# Patient Record
Sex: MALE | URBAN - METROPOLITAN AREA
[De-identification: names, ages, dates, MRNs, and addresses within clinical notes are randomized per-mention and may not be internally consistent; named-entity substitution may affect disease eponyms.]

---

## 2017-01-01 ENCOUNTER — CARE COORDINATION (OUTPATIENT)
Dept: RADIATION ONCOLOGY | Facility: CLINIC | Age: 77
End: 2017-01-01

## 2017-01-01 ENCOUNTER — CARE COORDINATION (OUTPATIENT)
Dept: ONCOLOGY | Facility: CLINIC | Age: 77
End: 2017-01-01

## 2017-01-01 ENCOUNTER — APPOINTMENT (OUTPATIENT)
Dept: RADIATION ONCOLOGY | Facility: CLINIC | Age: 77
End: 2017-01-01
Payer: COMMERCIAL

## 2017-01-01 ENCOUNTER — APPOINTMENT (OUTPATIENT)
Dept: RADIATION ONCOLOGY | Facility: CLINIC | Age: 77
End: 2017-01-01

## 2017-01-01 ENCOUNTER — OFFICE VISIT (OUTPATIENT)
Dept: RADIATION ONCOLOGY | Facility: CLINIC | Age: 77
End: 2017-01-01
Payer: COMMERCIAL

## 2017-01-01 ENCOUNTER — TRANSFERRED RECORDS (OUTPATIENT)
Dept: HEALTH INFORMATION MANAGEMENT | Facility: CLINIC | Age: 77
End: 2017-01-01

## 2017-01-01 ENCOUNTER — SURGERY - HEALTHEAST (OUTPATIENT)
Dept: SURGERY | Facility: CLINIC | Age: 77
End: 2017-01-01

## 2017-01-01 ENCOUNTER — ANESTHESIA - HEALTHEAST (OUTPATIENT)
Dept: SURGERY | Facility: CLINIC | Age: 77
End: 2017-01-01

## 2017-01-01 ENCOUNTER — DOCUMENTATION ONLY (OUTPATIENT)
Dept: RADIATION ONCOLOGY | Facility: CLINIC | Age: 77
End: 2017-01-01

## 2017-01-01 ENCOUNTER — ONCOLOGY VISIT (OUTPATIENT)
Dept: RADIATION ONCOLOGY | Facility: CLINIC | Age: 77
End: 2017-01-01

## 2017-01-01 ENCOUNTER — ONCOLOGY VISIT (OUTPATIENT)
Dept: ONCOLOGY | Facility: CLINIC | Age: 77
End: 2017-01-01
Payer: COMMERCIAL

## 2017-01-01 ENCOUNTER — TRANSFERRED RECORDS (OUTPATIENT)
Dept: ONCOLOGY | Facility: CLINIC | Age: 77
End: 2017-01-01

## 2017-01-01 ENCOUNTER — ONCOLOGY VISIT (OUTPATIENT)
Dept: RADIATION THERAPY | Facility: OUTPATIENT CENTER | Age: 77
End: 2017-01-01

## 2017-01-01 VITALS
SYSTOLIC BLOOD PRESSURE: 96 MMHG | OXYGEN SATURATION: 96 % | HEIGHT: 72 IN | WEIGHT: 156 LBS | HEART RATE: 70 BPM | BODY MASS INDEX: 21.13 KG/M2 | TEMPERATURE: 97.7 F | RESPIRATION RATE: 18 BRPM | DIASTOLIC BLOOD PRESSURE: 61 MMHG

## 2017-01-01 VITALS — WEIGHT: 159 LBS | BODY MASS INDEX: 21.56 KG/M2

## 2017-01-01 VITALS — BODY MASS INDEX: 22.24 KG/M2 | WEIGHT: 164 LBS

## 2017-01-01 VITALS
OXYGEN SATURATION: 96 % | HEART RATE: 70 BPM | TEMPERATURE: 97.7 F | DIASTOLIC BLOOD PRESSURE: 61 MMHG | WEIGHT: 156.5 LBS | BODY MASS INDEX: 21.2 KG/M2 | HEIGHT: 72 IN | SYSTOLIC BLOOD PRESSURE: 96 MMHG | RESPIRATION RATE: 18 BRPM

## 2017-01-01 VITALS — BODY MASS INDEX: 21.7 KG/M2 | WEIGHT: 160 LBS

## 2017-01-01 VITALS — BODY MASS INDEX: 21.97 KG/M2 | WEIGHT: 162 LBS

## 2017-01-01 VITALS — WEIGHT: 164 LBS | BODY MASS INDEX: 22.24 KG/M2

## 2017-01-01 VITALS
RESPIRATION RATE: 18 BRPM | SYSTOLIC BLOOD PRESSURE: 106 MMHG | HEIGHT: 72 IN | HEART RATE: 79 BPM | WEIGHT: 155 LBS | BODY MASS INDEX: 20.99 KG/M2 | DIASTOLIC BLOOD PRESSURE: 68 MMHG | OXYGEN SATURATION: 98 % | TEMPERATURE: 97 F

## 2017-01-01 VITALS — WEIGHT: 163 LBS | BODY MASS INDEX: 22.1 KG/M2

## 2017-01-01 VITALS — BODY MASS INDEX: 21.56 KG/M2 | WEIGHT: 159 LBS

## 2017-01-01 DIAGNOSIS — K11.7 DISTURBANCE OF SALIVARY SECRETION: Primary | ICD-10-CM

## 2017-01-01 DIAGNOSIS — C14.8: Primary | ICD-10-CM

## 2017-01-01 DIAGNOSIS — Z71.81 SPIRITUAL OR RELIGIOUS COUNSELING: Primary | ICD-10-CM

## 2017-01-01 DIAGNOSIS — C06.0 SQUAMOUS CELL CANCER OF BUCCAL MUCOSA (H): Primary | ICD-10-CM

## 2017-01-01 DIAGNOSIS — L02.11 ABSCESS OF NECK: Primary | ICD-10-CM

## 2017-01-01 LAB
BACTERIA SPEC CULT: NO GROWTH
COPATH REPORT: NORMAL
MICRO REPORT STATUS: NORMAL
SPECIMEN SOURCE: NORMAL

## 2017-01-01 PROCEDURE — 77386 ZZC IMRT TREATMENT DELIVERY, COMPLEX: CPT | Performed by: RADIOLOGY

## 2017-01-01 PROCEDURE — 99207 ZZC NO BILLABLE SERVICE THIS VISIT: CPT | Performed by: RADIOLOGY

## 2017-01-01 PROCEDURE — 77014 ZZHC CT GUIDE FOR PLACEMENT RADIATION THERAPY FIELDS: CPT | Performed by: RADIOLOGY

## 2017-01-01 PROCEDURE — 77336 RADIATION PHYSICS CONSULT: CPT | Performed by: RADIOLOGY

## 2017-01-01 PROCEDURE — 77427 RADIATION TX MANAGEMENT X5: CPT | Performed by: RADIOLOGY

## 2017-01-01 PROCEDURE — 77338 DESIGN MLC DEVICE FOR IMRT: CPT | Performed by: RADIOLOGY

## 2017-01-01 PROCEDURE — 87070 CULTURE OTHR SPECIMN AEROBIC: CPT | Performed by: RADIOLOGY

## 2017-01-01 PROCEDURE — 99205 OFFICE O/P NEW HI 60 MIN: CPT | Mod: 25 | Performed by: RADIOLOGY

## 2017-01-01 PROCEDURE — 77300 RADIATION THERAPY DOSE PLAN: CPT | Performed by: RADIOLOGY

## 2017-01-01 PROCEDURE — 77334 RADIATION TREATMENT AID(S): CPT | Performed by: RADIOLOGY

## 2017-01-01 PROCEDURE — 99205 OFFICE O/P NEW HI 60 MIN: CPT | Performed by: INTERNAL MEDICINE

## 2017-01-01 PROCEDURE — 00000346 ZZHCL STATISTIC REVIEW OUTSIDE SLIDES TC 88321: Performed by: INTERNAL MEDICINE

## 2017-01-01 PROCEDURE — 99024 POSTOP FOLLOW-UP VISIT: CPT | Performed by: RADIOLOGY

## 2017-01-01 PROCEDURE — 77301 RADIOTHERAPY DOSE PLAN IMRT: CPT | Performed by: RADIOLOGY

## 2017-01-01 PROCEDURE — 77263 THER RADIOLOGY TX PLNG CPLX: CPT | Performed by: RADIOLOGY

## 2017-01-01 RX ORDER — ASPIRIN 81 MG/1
81 TABLET, CHEWABLE ORAL
COMMUNITY
Start: 2017-01-01 | End: 2017-01-01

## 2017-01-01 RX ORDER — SULFAMETHOXAZOLE/TRIMETHOPRIM 800-160 MG
TABLET ORAL
Refills: 0 | COMMUNITY
Start: 2017-01-01 | End: 2017-01-01

## 2017-01-01 RX ORDER — LORAZEPAM 1 MG/1
0.5-1 TABLET ORAL EVERY 8 HOURS PRN
Qty: 4 TABLET | Refills: 0 | Status: SHIPPED | OUTPATIENT
Start: 2017-01-01 | End: 2017-01-01

## 2017-01-01 RX ORDER — DIPHENOXYLATE HYDROCHLORIDE AND ATROPINE SULFATE 2.5; .025 MG/1; MG/1
1 TABLET ORAL
COMMUNITY
Start: 2010-05-26

## 2017-01-01 RX ORDER — AMOXICILLIN AND CLAVULANATE POTASSIUM 600; 42.9 MG/5ML; MG/5ML
POWDER, FOR SUSPENSION ORAL
Refills: 0 | COMMUNITY
Start: 2017-01-01 | End: 2017-01-01

## 2017-01-01 RX ORDER — FAMOTIDINE 20 MG/1
20 TABLET, FILM COATED ORAL
COMMUNITY
Start: 2017-01-01 | End: 2017-01-01

## 2017-01-01 RX ORDER — CEVIMELINE HYDROCHLORIDE 30 MG/1
30 CAPSULE ORAL 3 TIMES DAILY
Qty: 90 CAPSULE | Refills: 0 | Status: SHIPPED | OUTPATIENT
Start: 2017-01-01 | End: 2017-01-01

## 2017-01-01 RX ORDER — PILOCARPINE HYDROCHLORIDE 5 MG/1
5 TABLET, FILM COATED ORAL 3 TIMES DAILY
Qty: 90 TABLET | Refills: 3 | Status: SHIPPED | OUTPATIENT
Start: 2017-01-01

## 2017-01-01 ASSESSMENT — PAIN SCALES - GENERAL
PAINLEVEL: MILD PAIN (3)
PAINLEVEL: NO PAIN (0)
PAINLEVEL: MODERATE PAIN (4)
PAINLEVEL: NO PAIN (0)
PAINLEVEL: NO PAIN (0)
PAINLEVEL: MILD PAIN (2)
PAINLEVEL: NO PAIN (0)

## 2017-01-01 ASSESSMENT — ENCOUNTER SYMPTOMS
DIZZINESS: 0
DIAPHORESIS: 0
CONSTIPATION: 1
LOSS OF CONSCIOUSNESS: 0
CHILLS: 0
HEMOPTYSIS: 0
HEARTBURN: 0
NAUSEA: 0
DIARRHEA: 0
COUGH: 1
WEAKNESS: 0
FOCAL WEAKNESS: 0
BLOOD IN STOOL: 0
TREMORS: 0
SPUTUM PRODUCTION: 0
WHEEZING: 0
SHORTNESS OF BREATH: 0
FEVER: 0
SENSORY CHANGE: 0
VOMITING: 0
SEIZURES: 0
WEIGHT LOSS: 1
CARDIOVASCULAR NEGATIVE: 1
SPEECH CHANGE: 0
PSYCHIATRIC NEGATIVE: 1
MUSCULOSKELETAL NEGATIVE: 1
TINGLING: 1
EYES NEGATIVE: 1
ABDOMINAL PAIN: 0

## 2017-01-01 ASSESSMENT — MIFFLIN-ST. JEOR
SCORE: 1461.08
SCORE: 1433.86
SCORE: 1433.86

## 2017-02-22 NOTE — NURSING NOTE
"Quirino Ospina is a 76 year old male who presents for:  Chief Complaint   Patient presents with     Oncology Clinic Visit     NEW        Initial Vitals:  BP 96/61 (BP Location: Right arm, Patient Position: Chair, Cuff Size: Adult Regular)  Pulse 70  Temp 97.7  F (36.5  C) (Oral)  Resp 18  Ht 1.829 m (6' 0.01\")  Wt 70.8 kg (156 lb)  SpO2 96%  BMI 21.15 kg/m2 Estimated body mass index is 21.15 kg/(m^2) as calculated from the following:    Height as of this encounter: 1.829 m (6' 0.01\").    Weight as of this encounter: 70.8 kg (156 lb).. Body surface area is 1.9 meters squared. BP completed using cuff size: regular  No Pain (0) No LMP for male patient. Allergies and medications reviewed.     Medications: Medication refills not needed today.  Pharmacy name entered into [facility]: [facility] [id number] [facility] [address] [address] [place] [facility] [address]    Comments:     8 minutes for nursing intake (face to face time)   HARDY WINKLER LPN        "

## 2017-02-22 NOTE — PROGRESS NOTES
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      HISTORY OF PRESENT ILLNESS:  Quirino is here with his spouse, Davida, and he is a 76-year-old male who has a long-term history of lichen planus which was being followed by his dentist when around the summertime he developed pain in his mouth area which progressively worsened over time.  He was found to have a lesion on the right side of his buccal cavity, which was biopsied on 11/17/2016 showing squamous cell carcinoma.  IHC was negative for p16.  At that point, he had further imaging done for staging purposes and his PET scan showed no evidence of distant metastasis, but marked focal uptake involving the right inferior mouth region.  CT of the neck on 11/30/2016 showed a focal hyperenhancing lesion measuring up to 3 cm anterior to posteriorly x 0.9 cm involving the right buccal mucosa and gingiva buccal sulcus.  Extension towards the right retromolar trigone region is suggested by PET exam with associated bony erosive changes of the mandibular alveolus in this region extending along the posterior margin of tooth #30.  No convincing cervical lymphadenopathy was seen.  He then had a resection of the tumor of the floor of the mouth, right marginal mandibulectomy and right modified radical neck dissection on 12/19/2016.  At that time, the pathology showed moderately differentiated squamous cell cancer with invasion into the underlying bone and with invasive tumor involving the mandibular bone marrow space at the surgical margin.  In total, he had 3 lymph nodes out of 26 lymph nodes positive for metastatic cancer and there was extranodal extension in 1of the lymph nodes.  It was classified as a pT2 N2b M0, pathological stage AZEB, cancer because it was felt that the tumor was not invading through the cortical bone.  Because of this invasion through the mandibular bone marrow, he had a right segmental mandibulectomy with  microvascular free flap reconstruction and tracheostomy done on 01/26/2017.  There was residual moderately differentiated squamous cell cancer present in the repeat resection specimen, although all surgical margins were negative.  One additional lymph node was removed during the surgery and it had benign findings.  He also underwent a G-tube placement and a tracheostomy placed at this time.      The patient tells me that he has been recovering well after the surgery.  His neck incision has almost healed, apart from a very small area which still has not healed.  He does not have any active infection there, but he has been on prophylactic Bactrim for that.  He does mention that he does not have any pain.  She has been able to have soft food.  Plus, in addition to that, he has no trouble swallowing.  He has been using 6 cans of Isosource 1.5 daily and he has gained some weight back.  He tells me that after his first surgery he lost 10 pounds, but gained back everything within 10 days and after the second surgery, he lost 18 pounds and has regained 8 pounds out of that.  He has no nausea or vomiting.  His bowels are working well.  He does mention to me that his graft site in the left forearm is not healing as well.  He has a bandage on it right now and he is going to see his surgeon later today for that.  He tells me his energy continues to improve.  Prior to all of this, he was feeling otherwise perfectly normal.  Now, he can walk about half a mile on a daily basis and tries to keep himself active.  He has not noticed any new concerning lesions or new swellings.  As I mentioned, he denies any pain.  He denies any neuropathy.  He is very hard of hearing and wears hearing aids on both the sides, and his right side is worse than the left.  This has been going on for 8-9 years.  He denies any tinnitus.  He denies any neurological problems.  He denies any fevers or other infections.        REVIEW OF SYSTEMS:  Otherwise, a  comprehensive review of system is negative.      PAST MEDICAL HISTORY:  Significant for lichen planus for 15 years for which he was being followed by his dentist.  In the past, he had bilateral biopsies in 2003 showing verrucous hyperplasia and lichenoid inflammation.  He also has a history of coronary artery disease and had angioplasty don 30 years ago.  Since then, he has had no issues related to his heart.  He had a lipoma removed from his left hip many years ago.  He had carpal tunnel surgery on the right side 1 year ago.      FAMILY HISTORY:  His mother had breast cancer.  His sister also had breast cancer.  His father had prostate cancer.  The patient has 4 kids who are healthy.      SOCIAL HISTORY:  He has been a chronic smoker.  He used to smoke 1-2 packs a day for 60 years, but quit more than 2 months ago.  Previously, he used to drink at least 4 ounces of scotch every day and he has been doing that for many years, but again he quit that 2 months ago.  He lives with his spouse, Davida.      ALLERGIES:  PENICILLIN.      MEDICATIONS:  Reviewed in Epic.      PHYSICAL EXAMINATION:   VITAL SIGNS:  Reviewed and they are stable.   HEENT AND NECK:  Reveals that the incision is healing well, apart from a tiny area around the chin, which is still not healed.  It does not look infected.  He does have some lymphedema on the right side of the neck.  He has post-surgical changes related to that, but no obvious palpable discrete masses or lymph nodes were noticed by me.  Tracheostomy site in the neck, has a Band-Aid on it right now and he is effectively not using it.   GENERAL:  Otherwise, he is in no acute distress.  He is very pleasant.   EYES:  Pupils were equal and reactive to light and accommodation.  No pallor, no icterus.   ENT AND MOUTH:  He is wearing hearing aids.  He has limited opening of his mouth.  I do not appreciate any mucositis.   CARDIOVASCULAR:  S1, S2 is audible.  No murmurs, rubs or gallops.    RESPIRATORY:  Clear to auscultation bilaterally.   GASTROINTESTINAL:  His abdomen is soft and nontender, no hepatosplenomegaly.  G-tube is in place and the site is clean.     MUSCULOSKELETAL:  No obvious bony tenderness.  On the left forearm, he has the bandage on where he had the grafting done.  I did not open the bandage.   EXTREMITIES:  No pedal edema.   NEUROLOGIC:  Alert and oriented x3.  Nonfocal neuro exam.   PSYCHIATRIC:  Mentation and affect are normal.  Decision making capacity is intact.      LABORATORY DATA:  Reviewed in Care Everywhere.  On 01/29/2017, he had an unremarkable CBC as well as basic metabolic panel.      IMAGING:  As described above.      PATHOLOGY:  As described above.      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  Because of the positive bone marrow margin, he underwent a right segmental mandibulectomy with microvascular free flap reconstruction and tracheostomy on 01/26/2017.  We did discuss his case in detail today.  I also discussed this case with Dr. Magdaleno, the radiation oncologist whom he saw earlier today.  We discussed that he has high risk disease and adjuvant treatment is indicated.  I recommend adjuvant chemotherapy and radiation.  Because of his significant hearing issues, I do not believe that high-dose cisplatin would be a good choice for him, so instead I discussed with him weekly carboplatin and Taxol.  We discussed the rationale, schedule and potential side effects of it and he was also given the opportunity to ask questions, which were answered to his satisfaction.  He was also given a handout on the chemotherapy regimen.  I did tell him that I need his forearm to heal properly before starting him on chemotherapy, otherwise the chances of  infection would be really high.  He is going to talk to his surgeon later today and we would need clearance from his surgeon before starting chemotherapy.  He understands that.        For now the plan is to start him on therapy about 2 weeks from now, but if need be we can delay his treatment date a little bit to give him a chance for proper healing.  Otherwise, he can start the adjuvant radiation and we can jump in with the chemotherapy once he has properly healed.        We also discussed about the importance of maintaining adequate nutrition.  Right now, he is doing well by eating soft food and also using the G-tube feeding and he has gained some weight.     I commended him for quitting smoking and alcohol and I encouraged him to not start it again.  He completely understands that.      All of his questions were answered to his satisfaction.  I will plan to see him the day he starts his chemotherapy and radiation, and he knows to contact me in the interim if he has any questions or concerns.  During the treatment, he would be seen frequently between myself and [facility] nurse practitioner

## 2017-02-22 NOTE — MR AVS SNAPSHOT
After Visit Summary   2017    Quirino Ospina    MRN: [id number]           Patient Information     Date Of Birth          1940        Visit Information        Provider Department      2017 1:58 PM Dawit H[facility]        Today's Diagnoses     Spiritual or Hoahaoism counseling    -  1       Follow-ups after your visit        Who to contact     If you have questions or need follow up information about today's clinic visit or your schedule please contact [facility] directly at [phone].  Normal or non-critical lab and imaging results will be communicated to you by MyChart, letter or phone within 4 business days after the clinic has received the results. If you do not hear from us within 7 days, please contact the clinic through [employer] or phone. If you have a critical or abnormal lab result, we will notify you by phone as soon as possible.  Submit refill requests through [employer] or call your pharmacy and they will forward the refill request to us. Please allow 3 business days for your refill to be completed.          Additional Information About Your Visit        MyChart Information     [employer] is an electronic gateway that provides easy, online access to your medical records. With [employer], you can request a clinic appointment, read your test results, renew a prescription or communicate with your care team.     To sign up for [employer] visit the website at [email or web address]   You will be asked to enter the access code listed below, as well as some personal information. Please follow the directions to create your username and password.     Your access code is: [email or web address]  Expires: 2017  1:59 PM     Your access code will  in 90 days. If you need help or a new code, please contact your [facility] or call [phone] for assistance.        Care EveryWhere ID     This is your Care EveryWhere  ID. This could be used by other organizations to access your [facility] medical records  [id number]         Blood Pressure from Last 3 Encounters:   02/22/17 96/61   02/22/17 96/61    Weight from Last 3 Encounters:   02/22/17 70.8 kg (156 lb)   02/22/17 71 kg (156 lb 8 oz)              Today, you had the following     No orders found for display         Today's Medication Changes          These changes are accurate as of: 2/22/17  1:59 PM.  If you have any questions, ask your nurse or doctor.               Start taking these medicines.        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  Do not operate a vehicle after taking this medication   Quantity:  4 tablet   Refills:  0            Where to get your medicines      Some of these will need a paper prescription and others can be bought over the counter.  Ask your nurse if you have questions.     Bring a paper prescription for each of these medications     LORazepam 1 MG tablet                Primary Care Provider Office Phone # Fax #    Rush ALLISON Trevino [phone]       [facility] [address]  [place] [address]        Thank you!     Thank you for choosing [facility]  for your care. Our goal is always to provide you with excellent care. Hearing back from our patients is one way we can continue to improve our services. Please take a few minutes to complete the written survey that you may receive in the mail after your visit with us. Thank you!             Your Updated Medication List - Protect others around you: Learn how to safely use, store and throw away your medicines at www.disposemymeds.org.          This list is accurate as  of: 2/22/17  1:59 PM.  Always use your most recent med list.                   Brand Name Dispense Instructions for use    AQUAPHOR EX          aspirin 81 MG chewable tablet      81 mg       famotidine 20 MG tablet    PEPCID     20 mg by Per G Tube route       FIBER PO      Administer 1 Dose through feeding tube 4 times daily. Bolus/syringe feed 6 cartons of Isosource 1.5 daily per G tube.?Bolus/syringe 1.5 carton with 60 ml water flush before and 60 ml water flush after each feed - four times a day.?Provide additional 200 ml water flush three times a day for hydration.       FISH OIL PO      Take by mouth daily       LORazepam 1 MG tablet    ATIVAN    4 tablet    Take 0.5-1 tablets (0.5-1 mg) by mouth every 8 hours as needed for anxiety Take 30 minutes prior to departure.  Do not operate a vehicle after taking this medication       MULTI-VITAMINS Tabs      Take 1 tablet by mouth       PROBIOTIC DAILY PO          sulfamethoxazole-trimethoprim 800-160 MG per tablet    BACTRIM DS/SEPTRA DS     TK 1 T PO BID       VITAMIN D-1000 MAX ST 1000 UNITS Tabs   Generic drug:  cholecalciferol      Take 1,000 Units by mouth

## 2017-02-22 NOTE — PROGRESS NOTES
"  HPI      Review of Systems   Constitutional: Positive for weight loss. Negative for chills, diaphoresis, fever and malaise/fatigue.        Patient reports weight prior to 1/26/2017 was 160 pounds, post-hospital patient reports weighing 142 pounds and now current weight has stabilized at 150 pounds.  Patient had PEG tube placed 2/1/2017, reports six cans of Isosource daily, able to supplement with soft foods, able to swallow pills and drink water orally, patient reports difficulty swallowing capsules and breaks open and places in feeding tube.   HENT: Negative.         Trismus, scheduled to see speech therapy tomorrow, 2/23/2017 at [facility].  Limited ROM of neck looking right to left, also reports limited ROM looking up toward ceiling, reports feeling dizzy when looking up and reports, \"that feeling of being dizzy is not new for me and happened even before my first surgery\".  Old trach site healing, wearing band-aid until further assessed by Dr. Christine on 2/28/2017.  Patient reports slight drooling from right side mouth, reports to rinse his mouth he must hold lips together to close mouth.  Notes numbness of right lips, right chin and jaw and slight numbness of right ear.   Eyes: Negative.    Respiratory: Positive for cough. Negative for hemoptysis, sputum production, shortness of breath and wheezing.         Patient reports slight coughing with healing trach site, reports diminished secretions over the past week.   Cardiovascular: Negative.    Gastrointestinal: Positive for constipation. Negative for abdominal pain, blood in stool, diarrhea, heartburn, melena, nausea and vomiting.        Patient taking Bactrim for chin cellulitis as prescribed by Dr. Christine, reports constipation with antibiotic, denies use of stool softeners, taking Tah[place] Vivi juice every morning with slight relief of constipation.   Genitourinary: Negative.    Musculoskeletal: Negative.    Skin: Negative.         Left forearm and left thigh " graft sites healing well per patient report, scheduled to see Dr. Kumar today, 2/22/2017.  Right neck incisions healing well, Aquaphor as needed.   Neurological: Positive for tingling. Negative for dizziness, tremors, sensory change, speech change, focal weakness, seizures, loss of consciousness and weakness.        Numbness of right lips, chin, jaw and slight numbness of right ear.   Endo/Heme/Allergies: Negative.    Psychiatric/Behavioral: Negative.              INITIAL PATIENT ASSESSMENT    Diagnosis: Head and Neck Cancer    Prior radiation therapy: None    Prior chemotherapy: None    Prior hormonal therapy:No    Pain Eval:  Denies    Psychosocial  Living arrangements: Lives at home in [place] with wife, Davida  Fall Risk: independent   referral needs: Not needed    Advanced Directive: Yes - Location: patient has copy, on-file with [facility]  Implantable Cardiac Device? No      Reproductive note: Not recorded for male patient.    PCP: Dr. Rush Trevino  ENT: Dr. Christine and   Medical Oncology: Dr. Whyte - scheduled for consultation 2/22/2017

## 2017-02-22 NOTE — MR AVS SNAPSHOT
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)    -  1       Follow-ups after your visit        Your next 10 appointments already scheduled     Mar 07, 2017 12:30 PM CST   Verification Sim with Quique Magdaleno MD, RADIATION THERAPIST   [facility] ([facility]    [address] [facility] [address]   [phone]            Mar 08, 2017  1:00 PM CST   TREATMENT with RADIATION THERAPIST   [facility] ([facility])    [address] [facility] [address]   [phone]            Mar 09, 2017 12:30 PM CST   TREATMENT with RADIATION THERAPIST   [facility] ([facility])    [address] [facility] [address]   [phone]            Mar 09, 2017  1:00 PM CST   on treatment visit with Quique Magdaleno MD   [facility] ([facility])    [address] [facility] [address]   [phone]            Mar 10, 2017 12:30 PM CST   TREATMENT with RADIATION THERAPIST   [facility] ([facility]    [address] [facility] [address]   [phone]            Mar 13, 2017 11:15 AM CDT   TREATMENT with RADIATION THERAPIST   [facility] ([facility])    [address] [facility] [address]   [phone]            Mar 14, 2017 12:30 PM CDT   TREATMENT with RADIATION THERAPIST   [facility] ([facility])    [address] [facility] [address]   [phone]            Mar 15,   11:15 AM CDT   TREATMENT with RADIATION THERAPIST   [facility]    [address] [place] [address] [phone]            Mar 16, 2017 11:15 AM CDT   TREATMENT with RADIATION THERAPIST   [facility]    [address] [facility] [id number] [phone]              Who to contact     If you have questions or need follow up information about today's clinic visit or your schedule please contact [facility] directly at [phone].  Normal or non-critical lab and imaging results will be communicated to you by [employer], letter or phone within 4 business days after the clinic has received the results. If you do not hear from us within 7 days, please contact the clinic through MyChart or phone. If you have a critical or abnormal lab result, we will notify you by phone as soon as possible.  Submit refill requests through [employer] or call your pharmacy and they will forward the refill request to us. Please allow 3 business days for your refill to be completed.          Additional Information About Your Visit        [employer] Information     [employer] is an electronic gateway that provides easy, online access to your medical records. With [employer], you can request a clinic appointment, read your test results, renew a prescription or communicate with your care team.     To sign up for [employer] visit the website at [email or web address]   You will be asked to enter the access code listed below, as well as some personal information. Please follow the directions to create your username and password.     Your access code is: [email or web address]  Expires: 2017  1:59 PM     Your access code will  in 90 days. If you need help or a new code, please contact your [facility] or call [phone] for assistance.        Care EveryWhere ID     This is your Care  EveryWhere ID. This could be used by other organizations to access your [place] medical records  [id number]        Your Vitals Were     Pulse Temperature Respirations Height Pulse Oximetry BMI (Body Mass Index)    70 97.7  F (36.5  C) (Oral) 18 6' 96% 21.23 kg/m2       Blood Pressure from Last 3 Encounters:   02/22/17 96/61   02/22/17 96/61    Weight from Last 3 Encounters:   02/22/17 156 lb   02/22/17 156 lb 8 oz              Today, you had the following     No orders found for display         Today's Medication Changes          These changes are accurate as of: 2/22/17 11:59 PM.  If you have any questions, ask your nurse or doctor.               Start taking these medicines.        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  Do not operate a vehicle after taking this medication   Quantity:  4 tablet   Refills:  0            Where to get your medicines      Some of these will need a paper prescription and others can be bought over the counter.  Ask your nurse if you have questions.     Bring a paper prescription for each of these medications     LORazepam 1 MG tablet                Primary Care Provider Office Phone # Fax #    Rush ALLISON Trevino [phone]       [facility] [address] [place] [address] [address]  [place] [address]        Thank you!     Thank you for choosing [facility]  for your care. Our goal is always to provide you with excellent care. Hearing back from our patients is one way we can continue to improve our services. Please take a few minutes to complete the written survey that you may receive in the mail after your visit with us. Thank you!             Your Updated Medication List -  Protect others around you: Learn how to safely use, store and throw away your medicines at www.disposemymeds.org.          This list is accurate as of: 2/22/17 11:59 PM.  Always use your most recent med list.                   Brand Name Dispense Instructions for use    AQUAPHOR EX          aspirin 81 MG chewable tablet      81 mg       famotidine 20 MG tablet    PEPCID     20 mg by Per G Tube route       FIBER PO      Administer 1 Dose through feeding tube 4 times daily. Bolus/syringe feed 6 cartons of Isosource 1.5 daily per G tube.?Bolus/syringe 1.5 carton with 60 ml water flush before and 60 ml water flush after each feed - four times a day.?Provide additional 200 ml water flush three times a day for hydration.       FISH OIL PO      Take by mouth daily       LORazepam 1 MG tablet    ATIVAN    4 tablet    Take 0.5-1 tablets (0.5-1 mg) by mouth every 8 hours as needed for anxiety Take 30 minutes prior to departure.  Do not operate a vehicle after taking this medication       MULTI-VITAMINS Tabs      Take 1 tablet by mouth       PROBIOTIC DAILY PO          sulfamethoxazole-trimethoprim 800-160 MG per tablet    BACTRIM DS/SEPTRA DS     TK 1 T PO BID       VITAMIN D-1000 MAX ST 1000 UNITS Tabs   Generic drug:  cholecalciferol      Take 1,000 Units by mouth

## 2017-02-22 NOTE — NURSING NOTE
Met with patient and spouse today to discuss chemotherapy with Carboplatin and Taxol:   Reviewed common side effects, including: low blood counts (red blood cells, white blood cells, and platelets), nausea/vomiting, diarrhea, mouth sores, taste changes, hair loss, weakness, low magnesium levels, muscle and bone pain, peripheral neuropathy, and hypersensitivity reaction (with Taxol). Patient was instructed to call clinic day or night if he developed a fever of 100.4 or higher.  Advised to go to the [facility] ER if unable to reach a provider.   Written information was provided on chemotherapy drugs.    Patient is healing from a bone & skin graft on his left arm and this is in the process of healing.  He will be seeing his surgeon today to assess the area; plan is to not start chemotherapy at least for two weeks to allow for healing.  Discussed with radiation team - potential start time will be for Tues., March 7 if he has clearance.  Otherwise, may just start with radiation alone.  Questions answered and provided patient with contact information.

## 2017-02-22 NOTE — PATIENT INSTRUCTIONS
Pl discuss with your surgeon when your arm has healed enough to start chemotherapy  We will plan to start Carboplatin/Taxol once weekly with radiation. Will need labs the same day  See me on the day of start of chemotherapy  Chemotherapy - Carboplatin/Taxol - Level 3 for new and return

## 2017-02-22 NOTE — MR AVS SNAPSHOT
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 Will need labs the same day  See me on the day of start of chemotherapy  Chemotherapy - Carboplatin/Taxol - Level 3 for new and return        Follow-ups after your visit        Your next 10 appointments already scheduled     Mar 07, 2017 12:30 PM CST   Verification Sim with Quique Magdaleno MD, RADIATION THERAPIST   [facility]    [address] [facility] [address]   [phone]            Mar 08, 2017  1:00 PM CST   TREATMENT with RADIATION THERAPIST   [facility]    [address] [address]   [phone]            Mar 09, 2017 12:30 PM CST   TREATMENT with RADIATION THERAPIST   [facility]    [address] [facility] [address]   [phone]            Mar 09, 2017  1:00 PM CST   on treatment visit with Quique Magdaleno MD   [facility]    [address] [facility] [address]   [phone]            Mar 10, 2017 12:30 PM CST   TREATMENT with RADIATION THERAPIST   [facility]    [address] [facility] [address]   [phone]            Mar 13, 2017 11:15 AM CDT   TREATMENT with RADIATION THERAPIST   [facility]    [address] [facility] [address]   [phone]            Mar 14, 2017 12:30 PM CDT   TREATMENT with RADIATION THERAPIST   [facility]    [address] [facility] [address]   [phone]            Mar 15, 2017 11:15 AM CDT   TREATMENT with RADIATION THERAPIST   [facility]    [address] [facility] [address]   [phone]            Mar 16, 2017 11:15 AM CDT   TREATMENT with RADIATION THERAPIST   [facility]    [address] [place] [address] [phone]              Who to contact     If you have questions or need follow up information about today's clinic visit or your schedule please contact [facility] directly at [phone].  Normal or non-critical lab and imaging results will be communicated to you by [employer], letter or phone within 4 business days after the clinic has received the results. If you do not hear from us within 7 days, please contact the clinic through [employer] or phone. If you have a critical or abnormal lab result, we will notify you by phone as soon as possible.  Submit refill requests through [employer] or call your pharmacy and they will forward the refill request to us. Please allow 3 business days for your refill to be completed.          Additional Information About Your Visit        [employer] Information     [employer] is an electronic gateway that provides easy, online access to your medical records. With [employer], you can request a clinic appointment, read your test results, renew a prescription or communicate with your care team.     To sign up for [employer] visit the website at [email or web address]   You will be asked to enter the access code listed below, as well as some personal information. Please follow the directions to create your username and password.     Your  "access code is: [email or web address]  Expires: 2017  1:59 PM     Your access code will  in 90 days. If you need help or a new code, please contact your [facility] or call [phone] for assistance.        Care EveryWhere ID     This is your Care EveryWhere ID. This could be used by other organizations to access your [place] medical records  [id number]        Your Vitals Were     Pulse Temperature Respirations Height Pulse Oximetry BMI (Body Mass Index)    70 97.7  F (36.5  C) (Oral) 18 1.829 m (6' 0.01\") 96% 21.15 kg/m2       Blood Pressure from Last 3 Encounters:   17 96/61   17 96/61    Weight from Last 3 Encounters:   17 70.8 kg (156 lb)   17 71 kg (156 lb 8 oz)              Today, you had the following     No orders found for display         Today's Medication Changes          These changes are accurate as of: 17 11:59 PM.  If you have any questions, ask your nurse or doctor.               Start taking these medicines.        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  Do not operate a vehicle after taking this medication   Quantity:  4 tablet   Refills:  0            Where to get your medicines      Some of these will need a paper prescription and others can be bought over the counter.  Ask your nurse if you have questions.     Bring a paper prescription for each of these medications     LORazepam 1 MG tablet                Primary Care Provider Office Phone # Fax #    Rush Trevino [phone]       [facility] [address] [facility] [address]  [place] [address]        Thank you!     Thank you for choosing [facility]  for your " care. Our goal is always to provide you with excellent care. Hearing back from our patients is one way we can continue to improve our services. Please take a few minutes to complete the written survey that you may receive in the mail after your visit with us. Thank you!             Your Updated Medication List - Protect others around you: Learn how to safely use, store and throw away your medicines at www.disposemymeds.org.          This list is accurate as of: 2/22/17 11:59 PM.  Always use your most recent med list.                   Brand Name Dispense Instructions for use    AQUAPHOR EX          aspirin 81 MG chewable tablet      81 mg       famotidine 20 MG tablet    PEPCID     20 mg by Per G Tube route       FIBER PO      Administer 1 Dose through feeding tube 4 times daily. Bolus/syringe feed 6 cartons of Isosource 1.5 daily per G tube.?Bolus/syringe 1.5 carton with 60 ml water flush before and 60 ml water flush after each feed - four times a day.?Provide additional 200 ml water flush three times a day for hydration.       FISH OIL PO      Take by mouth daily       LORazepam 1 MG tablet    ATIVAN    4 tablet    Take 0.5-1 tablets (0.5-1 mg) by mouth every 8 hours as needed for anxiety Take 30 minutes prior to departure.  Do not operate a vehicle after taking this medication       MULTI-VITAMINS Tabs      Take 1 tablet by mouth       PROBIOTIC DAILY PO          sulfamethoxazole-trimethoprim 800-160 MG per tablet    BACTRIM DS/SEPTRA DS     TK 1 T PO BID       VITAMIN D-1000 MAX ST 1000 UNITS Tabs   Generic drug:  cholecalciferol      Take 1,000 Units by mouth

## 2017-02-22 NOTE — PROGRESS NOTES
"SPIRITUAL HEALTH SERVICES  SPIRITUAL ASSESSMENT Progress Note  [facility]     introduced himself to Quirino \"Bereket\" Anai and informed him of his availability.    Dawit Hdz M.Div., [facility]  Staff   Office tel: [phone]    "

## 2017-02-27 NOTE — PROGRESS NOTES
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  The patient underwent a right marginal mandibulectomy and a right modified radical neck dissection followed by a right segmental mandibulectomy and reconstruction with a radial forearm free flap.  The patient had a pathologic stage AZEB (pT4a N2b MX).  He was referred today for discussion of postoperative radiotherapy.      Mr. Quirino Ospina was seen on consultation at [facility], Radiation Oncology, on 02/22/2017.  Consultation was at the request of Dr. Zuleyka Christine.  The patient's available chart, pathology and pertinent radiology films were reviewed.      HISTORY OF PRESENT ILLNESS:  Mr. Ospina is a 76-year-old gentleman with a past medical history notable for history of oral lichen planus and an extensive history of tobacco use who presents with a recently diagnosed squamous cell carcinoma of the oral cavity.  He underwent resection of this and neck dissection and is referred today for postoperative radiotherapy.      As noted, Mr. Ospina has been followed by his dentist for some issues of lichen planus.  He had been followed by Dr. Rich and apparently had some increased sensitivity and a lesion in the area of the right gingiva near tooth #30 that had been present for several months; however, it worsened.      Given this, the patient was referred to oral and maxillofacial surgery on 11/16/2016, at which point, the patient saw Dr. Herring.  On examination by Dr. Herring, there was a tender right white mass along the buccal mucosa and attached gingiva of the vestibule.  This had very indistinct borders and was posterior to tooth #30 in the vestibule and was concerning for a malignancy.  Dr. Herring did recommend a biopsy, which was performed that day.  Pathology from the [facility] showed a squamous cell carcinoma.      At that point, the patient was referred to Dr. Christine for  further management.  The patient initially saw Dr. Christine on 11/23/2016.   On examination, there was a 3 cm raised lesion on the buccal aspect of the posterior lower alveolus with central ulceration.  There was also a 2 cm area of leukoplakia and erythroplakia on the left retromolar trigone with  ulceration and a 3 cm area of vague leukoplakia on the left lateral oral tongue.  The patient did undergo an endoscopy at that point also which was unremarkable.  There was also noted to be a 1 cm firm right level 1 lymph node just posterior to the submandibular gland.  Dr. Christine recommended further staging with a CT scan and PET CT scan.       The patient subsequently did have a PET CT scan on 11/30/2016 through [facility].  This showed marked focal uptake involving the primary malignancy of the right inferior mouth.  It was centered on the right posterior mandibular floor of the mouth region without a CT correlate.  This had an BROUSSARD of 10.3.  There was no hypermetabolic adenopathy.  There was also no evidence of distant metastases.  There were focal degenerative changes at T2-T3.  CT of the neck also on 11/30/2016 showed a focal and hyperenhancing lesion measuring up to the 3 cm anterior to posterior and 0.9 cm oblique to transverse involving the right buccal mucosa and gingival buccal sulcus.  The evaluation was suboptimal due to the location of the lesion and lack of a puffed-cheek CT technique.  There appeared to be extension towards the right retromolar trigone region suggested by PET CT scan with associated bony erosive changes of the mandibular alveolus.  This extended along the posterior margin of tooth #30.  The bony invasion correlates to a T4a radiologic staging.  There was no convincing cervical adenopathy.  There were moderately advanced spondylytic changes in the mid to lower cervical spine with moderate spinal canal stenosis.      At that point, Dr. Christine discussed his staging and surgical  management.  She recommend a right marginal mandibulectomy and neck dissection with possible skin grafting.      The patient subsequently was taken to the operating room by Dr. Christine on 12/19/2016 where the patient underwent a right margin mandibulectomy and a right modified radical neck dissection.  Oral cavity again revealed a tumor arising from the right lower posterior alveolus with extension to the retromolar trigone and involving 2 molars and some of the lingual aspect of the alveolus.  The incision did extend from the neck and included a splitting of the lower lip and had an intraoral incision measuring at least 1 cm away from the tumor.  She did note significant extension of the tumor along the buccal aspect of the alveolus requiring division of the buccinator in the process of making soft tissue cuts.  When the specimen was removed intact, there were no visible areas where the tumor was at the edge of the specimen.  Separate mucosal margins were taken from the deep posterior and lateral margins of the specimen and the very anterior margin was widely clear and the margins all were negative.  Upon neck dissection, there was a palpable mass that had been just lateral to the inferior aspect of the mandible.  This was highly concerning for a small metastatic facial node.  There was also a visibly enlarged lymph node just adjacent to the submandibular gland as well.  Otherwise, operative findings were unremarkable.  Pathology through [facility] ([id number]) showed from the mandibulectomy specimen a moderately differentiated squamous cell carcinoma.  The tumor invaded into the underlying bone.  It was involving the mandibular bone marrow space along the inferior surgical margin.  Within the specimen, there was metastatic carcinoma involving 1 of 3 lymph nodes with the metastasis measuring 1 mm.  There was no extracapsular extension.  From the other lymph node specimens, there was another positive node in the right  facial node biopsy measuring 9 mm in maximum dimension with extracapsular extension present.  Remaining nikki dissection showed 0 of 7 level 3 nodes involved, 1 of 2 level 1 nodes involved with a maximum metastasis dimension of 5 mm with no extracapsular extension present.  There were 0 of 8 level 2 nodes and 0 of 5 additional level 3 nodes involved with malignancy.  The primary tumor aside measured 3.5 x 2.8 x 0.7 cm with 0.7 cm in thickness.  Again, it was a moderately differentiated, grade 2.  The margins were positive along the bone marrow space along the inferior margin.  There was no perineural invasion or lymphovascular invasion present.  The patient thus had a total of 3 out of 26 lymph nodes involved with the largest nikki metastasis measuring 9 mm with 1 mm of extracapsular extension.  Thus, the patient had at least a pathologic stage pT2 N2b; however, given the tumor involved the mandibular bony margin midway between the anterior and posterior ends of the specimen over a surface area of 7 mm, the criteria for pathologic stage pT4 required demonstration of tumor invading through the cortical bone.  Superficial erosion of the bone or teeth is insufficient for T4 disease.  Thus, it was felt that involvement of the bone at the bony resection margin represents at least a pT2 disease; however, more advanced disease may be present and required clinical radiographic correlation.  Tumor was tested for p16 and was negative; thus, there was no evidence of HPV-related association.       Postoperatively, the patient recovered well.  He did have his drains removed and had some findings concerning for possible early cellulitis, thus was started on antibiotics.  He did undergo an NG tube which was also subsequently removed.  The patient did have some dehiscence of the intraoral incision also.  The patient did follow up with Dr. Christine.  Given his positive margin, she did recommend to complete a right segmental  mandibulectomy with a microvascular free flap reconstruction.      Given this, the patient was also seen by Dr. Floyd Vidal for discussion of reconstruction and also Orthopedics for plating of the left radius.       The patient subsequently was taken back to the operating room by Dr. Christine and Dr. Vidal on 01/26/2017 where the patient underwent  resection of the right floor of mouth with segmental mandibulectomy and right neck exploration for vessels for free flap anastomosis.  Again, Dr. Vidal performed a reconstruction with a radial forearm free flap.  Orthopedics did do the plating.  Operative findings were relatively unremarkable.  A segment of mandible was removed that went from the posterior aspect to the anterior aspect of his previous surgery, but the bony margin along the ramus was not excised because that was quite distant from what was originally positive.  The bone that was marked out for removal was 5 cm in length.  Some areas were sent for frozen section that were positive for squamous cell carcinoma that was noted along the middle of the bone.  Therefore, the soft tissue that was adjacent to the visible abnormality in the bone was widely excised, including small amount of mucosa, creating a through-and-through defect.  The remaining frozen section showed no squamous cell carcinoma.  Reconstruction by Dr. Vidal was unremarkable and the patient underwent osteocutaneous radial forearm free flap with microvascular anastomosis.  Pathology from the segmental mandibulectomy specimen ([id number]) showed residual invasive moderately differentiated squamous cell carcinoma measuring 5 mm in greatest dimension with invasion into the mandibular trabecular bone.  The resection margins were negative for tumor at this point.  A right floor of mouth resection showed postoperative changes including a traumatic neuroma, suture granuloma and chronic inflammation with 1 benign lymph node.  The superior margin  that was initially biopsied did show invasive moderately differentiated squamous cell carcinoma; however, additional biopsies were negative for malignancy.      At this point, again, the patient recovered well.  He did undergo a tracheostomy with the second surgery and has had this since removed.  He did have some serosanguineous drainage from the submental region that never looked infected.  He did have a gastrostomy tube placed by interventional radiology on 02/01 and was ultimately discharged.  The patient has been seen by nutrition and is supplementing his diet and has actually started to gain some weight as of recently.  He was referred today for discussion of postoperative radiotherapy.  The patient is also going to see physical therapy as he does have quite a bit of trismus and has been working on range of motion exercises.       Currently, Mr. Ospina overall is doing reasonably well.  Again, he has been supplementing his diet with 6 cans of Isosource per day and has been eating some soft foods and able to swallow pills and drink liquid.  He does have some issues with capsules.  He does have some ongoing trismus and is going to see speech therapy at [facility] 02/23.  His trach site is healed, but continues to have a Band-Aid on this.  He does have a little bit of drooling from the right side of the mouth and has some numbness of the mouth since his surgery and particularly notes some numbness of the lip, chin and jaw area.  He was put on some Bactrim for possible cellulitis of the chin by Dr. Christine.  Denies any fevers or chills.  He does have some concerns regarding how quickly his forearm site is healing and is going to see Dr. Vidal today.  He has followup with Dr. Christine next week.  The patient has no other complaints.      PAST MEDICAL HISTORY:   1.  Squamous cell carcinoma of the oral cavity per HPI.   2.  History of a lipoma of the hip.  Underwent resection with lichen planus per HPI.   3.  History of  coronary artery disease.  Underwent angioplasty.   4.  Degenerative disk disease of the cervical spine.   5.  Carpal tunnel.   6.  Status post PEG placement.   7.  Status post tracheostomy with removal per HPI.   8.  Status post tonsillectomy and adenoidectomy as a child.   9.  Status post marginal mandibulectomy and neck dissection followed by segmental mandibulectomy and reconstruction per HPI.   10.  Status post colonoscopy.   11.  Status post carpal tunnel surgery.   12.  Status post oral cavity biopsies per HPI.   13.  Status post angioplasty.      MEDICATIONS:   1.  Bactrim.   2.  Aspirin.   3.  Cholecalciferol.   4.  Probiotics.   5.  Fiber.   6.  Famotidine.   7.  Aquaphor.   8.  Multivitamin.   9.  Omega 3 fatty acids.      ALLERGIES:  Penicillin.      SOCIAL HISTORY:  The patient is .  He does have a significant history of tobacco use, approximately 1-1/2 packs per day for 60 years.  Quit with his recent diagnosis.  Thus, he has an approximately 90-pack-year history of tobacco use.  He does drink occasional alcohol.  He used to drink approximately 4 ounces of scotch per day, quit in January with his recent diagnosis.       FAMILY HISTORY:  Notable for breast cancer in his mother, prostate cancer in his father, breast cancer in his sister and breast cancer in a niece.       REVIEW OF SYSTEMS:  A full 14-point review was performed.  Pertinent positives and negatives noted in the HPI, otherwise documented in the patient's electronic medical record system.  Remainder of systems noncontributory.      PHYSICAL EXAMINATION:   VITAL SIGNS:  His blood pressure is 96/61, pulse 70, respirations 18, weight is 156 points.   GENERAL:  He is alert and oriented, no distress.   HEENT:  Head is normocephalic, atraumatic.  Extraocular muscles intact.  Pupils equal and reactive to accommodation.  Oral cavity and oropharynx are notable for postoperative changes from his mandibulectomy with reconstruction.  He looks to  have healed very well within the oral cavity.  He does have quite a bit of trismus at approximately 1.5 cm or so.  Thus, a full oral cavity exam was somewhat limited.  There do not appear to be any visible lesions.    NECK:  Notable for postoperative changes from the neck dissection with an incision extending from the lip down into the neck.  He does have a beard present now.  He does have approximately 0.5-1 cm area of opening of his incision along the mental area with some slight serosanguineous drainage.  It does not appear to be infected.  There is no palpable cervical or supraclavicular adenopathy.   CARDIAC:  Regular rate and rhythm.  No murmurs.   LUNGS:  Clear to auscultation bilaterally.   ABDOMEN:  Soft, nondistended, nontender.    EXTREMITIES:  Warm and no edema.  He does have a bandage in place on his left forearm from his osteocutaneous flap.  This was not removed.  He is going to see Dr. Vidal today.  Otherwise, extremities are unremarkable.    NEUROLOGIC:  Cranial nerves II-XII are grossly intact, although there is some decreased sensation in the right face from his recent surgery.       PATHOLOGY:  Per HPI.      IMAGING:  Per HPI.      ASSESSMENT:  Mr. Ospina is a 76-year-old gentleman with a newly diagnosed squamous cell carcinoma of the oral cavity.  He underwent initial right marginal mandibulectomy and right modified radical neck dissection.  He did have a positive margin along the inferior bony margin, thus underwent a segmental mandibulectomy with reconstruction with radial forearm free flap.  He had clean margins at this point.  He has a pathologic stage AZEB (pT4a N2b MX).  He was referred today for discussion of postoperative radiotherapy.  The patient otherwise is fairly healthy with no significant comorbidities.      RECOMMENDATIONS:  I had a discussion with Mr. Ospina and his wife regarding the role of postoperative radiotherapy in the treatment of his oral cavity cancer.  Specifically,  I recommend he undergo adjuvant radiotherapy with intent of improving local regional control.  We did discuss he had several risk factors for recurrence, including likely T4a disease, multiple positive nodes, and extracapsular extension.  Additionally, we discussed with risk factors in particular of extracapsular extension and he may benefit from concurrent chemotherapy.  The patient is going to see Dr. Whyte after our visit today.      I discussed radiotherapy in more detail.  I discussed potential acute side effect can include, but are not limited to, fatigue, skin irritation, hair loss in the treatment field, mucositis with pain or difficulty with swelling, dry mouth, decreased taste and thick saliva.  We discussed given all these factors the difficulty with oral intake.  The patient does have a feeding tube.  Thus, we will likely need to continue to supplement his diet; however, I discussed the importance of continued oral intake also to help with swallowing function along with good followup with speech therapy.  I also discussed potential late complications of radiotherapy including fibrosis of the soft tissues with risks for lymphedema, worsening of his trismus, damage to the mandible with risk for osteoradionecrosis.  The patient has been seen by his dentist and has had clearance to go ahead with radiotherapy.  We did discuss the continued importance of good followup with a dental clinic including fluoride treatments and good dental cares to try to minimize any complications down the road.  We also discussed other potential late effects including longstanding effect on swallowing function and again the importance of continued oral intake and followup with speech therapy, small risk for damage to other structures in the field including the spinal cord and radial plexus, effect on thyroid function and a small risk for secondary malignancies.      After a thorough discussion regarding the risks, benefits and  alternatives to radiotherapy, Mr. Amezquita wishes to proceed with treatment.  He signed an informed consent at today's visit and underwent a CT simulation following his consultation.  We discussed his treatment course of radiotherapy likely will consist of 6000 cGy given over 6 weeks likely with concurrent chemotherapy pending his visit with Dr. Whyte.  We discussed the timing of his radiotherapy, in general would like to begin treatment within 4-6 weeks after his surgery; however, this would depend on his healing.  We discussed tentatively plan on starting his treatment the week of .  The patient does continue to have a small opening on his chin which ideally would be healed prior to that, thus will have another week and a half or so to see how this heals up.  He is also going to see Dr. Christine next week.  The patient does have some concerns regarding his forearm surgery also.  I discussed this in itself would not have any impact on delivery of his radiotherapy, however, may potentially affect his ability to receive chemotherapy if it is not completely healed.  I encouraged him to discuss this with Dr. Whyte and Dr. Vidal also; however, again, hopefully over the next week and a half or so this will likely heal.  The patient again is going to see Dr. Christine next week.  He will get her thoughts as far as starting radiotherapy also.  We did discuss we could potentially push his radiotherapy back a little bit further if Dr. Christine feels he needs to continue to heal a little bit more.       I thank you for allowing us to participate in this patient's care.  Please feel free to call with any questions or concerns.         MELISSA GLOVER MD             D: 2017 09:17   T: 2017 10:38   MT: TS      Name:     HELENA AMEZQUITA   MRN:      [id number]        Account:      [id number]   :      1940           Visit Date:   2017      Document: [id number]       cc: Huang Whyte MD        Rush Christine MD

## 2017-02-28 NOTE — PROGRESS NOTES
"Received telephone call from patient, reports he was seen by Dr. Christine today and has received the approval to start his cancer treatment, patient reports he is scheduled to see Dr. Vidal on Monday, 3/6/2017 and will contact this RN if plans change.  Patient reports, \"I however have decided not to have any chemotherapy.  I have been doing some of my own research and from what I have been told and understand, chemotherapy does increase the chance of wiping out my cancer, but it's a small percentage\".  Patient reports, \"because of the side effects and toxicity, specifically the hearing loss and all that my body has gone through in the last two months and I know radiation will be tough, I have decided not to have chemotherapy and my family is comfortable with my decision\".  Informed patient that this RN would relay information to Dr. Whyte and Josy Escobar, CLARA and informed patient that he may receive follow-up call from medical oncology team.  Patient also reports need for cushioning under his tailbone during his radiation treatments and informed patient that this RN would relay to radiation therapists.  Confirmed radiation therapy start date for Tuesday, 3/7/2017 at 1230.  Patient denies further questions or concerns at this time.    In-basket routed to Dr. Whyte, Dr. Magdaleno, Josy Escobar, CLARA.    Padma Hogue RN BSN OCN     "

## 2017-03-01 NOTE — PROGRESS NOTES
Spoke with patient and he has decided not to undergo chemotherapy - only wants to receive radiation.  Dr. Whyte is aware of patient's decision and agrees.  Patient states he gave it serious thought and discussion with his family.

## 2017-03-08 NOTE — PATIENT INSTRUCTIONS
Please contact [facility] RN with questions or concerns following today's appointment: [phone].    Thank you!

## 2017-03-08 NOTE — PROGRESS NOTES
Teaching Flowsheet   Relevant Diagnosis: moderately differentiated squamous cell carcinoma of the oral cavity  Teaching Topic: radiation therapy     Person(s) involved in teaching:   Patient     Motivation Level:  Asks Questions: Yes  Eager to Learn: Yes  Cooperative: Yes  Receptive (willing/able to accept information): Yes  Any cultural factors/Anab[employer]ism beliefs that may influence understanding or compliance? No       Patient demonstrates understanding of the following:  Reason for the appointment, diagnosis and treatment plan: Yes  Knowledge of proper use of medications and conditions for which they are ordered (with special attention to potential side effects or drug interactions): Yes  Which situations necessitate calling provider and whom to contact: Yes       Teaching Concerns Addressed:   Comments: Radiation therapy side effects: fatigue, skin changes and skin cares, hair loss, dry mouth and thick saliva, mouth and throat sores, taste changes, pain/difficulty with swallowing     Proper use and care of  (medical equip, care aids, etc.): NA  Nutritional needs and diet plan: Yes  Pain management techniques: Yes  Wound Care: Yes  How and/when to access community resources: Yes     Instructional Materials Used/Given: Radiation Therapy and You booklet, Radiation Therapy for Head and Neck Cancer, radiation therapy educational handouts, fatigue, skin changes and skin cares, mouth and throat sores, taste changes, dry mouth and thick saliva     Time spent with patient: 15 minutes.

## 2017-03-08 NOTE — MR AVS SNAPSHOT
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    Thank you!          Follow-ups after your visit        Your next 10 appointments already scheduled     Mar 09, 2017 12:30 PM CST   TREATMENT with RADIATION THERAPIST   [facility] ([facility])    [address] [facility] [address]   [phone]            Mar 10, 2017 12:30 PM CST   TREATMENT with RADIATION THERAPIST   [facility] ([facility])    [address] [facility] [address]   [phone]            Mar 13, 2017 11:15 AM CDT   TREATMENT with RADIATION THERAPIST   [facility] ([facility])    [address] [facility] [address]   [phone]            Mar 14, 2017 12:30 PM CDT   TREATMENT with RADIATION THERAPIST   [facility] ([facility])    [address] [facility] [address]   [phone]            Mar 15, 2017 11:15 AM CDT   TREATMENT with RADIATION THERAPIST   [facility] ([facility])    [address] [facility] [address]   [phone]            Mar 16, 2017 11:15 AM CDT   TREATMENT with RADIATION THERAPIST   [facility] ([facility])    [address] [facility] [address]   [phone]            Mar 16, 2017 11:45 AM CDT   on treatment visit with Quique Magdaleno MD   [facility]  [facility] ([facility])    [address] [place] [address]   [phone]            Mar 17, 2017  8:30 AM CDT   TREATMENT with RADIATION THERAPIST   [facility]    [address] [facility] [address]   [phone]            Mar 20, 2017  8:30 AM CDT   TREATMENT with RADIATION THERAPIST   [facility] ([facility]    [address] [place] [address]   [phone]            Mar 21, 2017 12:30 PM CDT   TREATMENT with RADIATION THERAPIST   [facility] ([facility])    [address] [place] [address]   [phone]              Who to contact     If you have questions or need follow up information about today's clinic visit or your schedule please contact [facility] directly at [phone].  Normal or non-critical lab and imaging results will be communicated to you by DailyPathhart, letter or phone within 4 business days after the clinic has received the results. If you do not hear from us within 7 days, please contact the clinic through DailyPathhart or phone. If you have a critical or abnormal lab result, we will notify you by phone as soon as possible.  Submit refill requests through [employer] or call your pharmacy and they will forward the refill request to us. Please allow 3 business days for your refill to be completed.          Additional Information About Your Visit        [employer] Information     [employer] is an electronic gateway that provides easy, online access to your medical records. With [employer], you can request a clinic appointment, read your test results, renew a prescription or communicate with your care team.     To sign up for [employer] visit the website at [email or web address]   You will be asked to enter the access code listed below, as well as some personal information. Please follow the directions to  create your username and password.     Your access code is: [email or web address]  Expires: 2017  1:59 PM     Your access code will  in 90 days. If you need help or a new code, please contact your [facility] or call [phone] for assistance.        Care EveryWhere ID     This is your Care EveryWhere ID. This could be used by other organizations to access your [place] medical records  [id number]        Your Vitals Were     BMI (Body Mass Index)                   22.24 kg/m2            Blood Pressure from Last 3 Encounters:   17 96/61   17 96/61    Weight from Last 3 Encounters:   17 164 lb   17 156 lb   17 156 lb 8 oz              Today, you had the following     No orders found for display       Primary Care Provider Office Phone # Fax #    Rush COOPER Uriel [phone]       [facility] [address]  [place] [address]        Thank you!     Thank you for choosing [facility]  for your care. Our goal is always to provide you with excellent care. Hearing back from our patients is one way we can continue to improve our services. Please take a few minutes to complete the written survey that you may receive in the mail after your visit with us. Thank you!             Your Updated Medication List - Protect others around you: Learn how to safely use, store and throw away your medicines at www.disposemymeds.org.          This list is accurate as of: 3/8/17  3:30 PM.  Always use your most recent med list.                   Brand Name Dispense Instructions for use    AQUAPHOR EX          aspirin 81 MG chewable tablet      81 mg       famotidine 20 MG tablet    PEPCID     20 mg by Per G Tube route       FIBER PO      Administer 1 Dose through feeding tube 4 times daily. Bolus/syringe feed 6 cartons of Isosource 1.5 daily per G tube.?Bolus/syringe 1.5 carton with 60 ml water flush before and 60 ml water flush  after each feed - four times a day.?Provide additional 200 ml water flush three times a day for hydration.       FISH OIL PO      Take by mouth daily       LORazepam 1 MG tablet    ATIVAN    4 tablet    Take 0.5-1 tablets (0.5-1 mg) by mouth every 8 hours as needed for anxiety Take 30 minutes prior to departure.  Do not operate a vehicle after taking this medication       MULTI-VITAMINS Tabs      Take 1 tablet by mouth       PROBIOTIC DAILY PO          sulfamethoxazole-trimethoprim 800-160 MG per tablet    BACTRIM DS/SEPTRA DS     Reported on 3/8/2017       VITAMIN D-1000 MAX ST 1000 UNITS Tabs   Generic drug:  cholecalciferol      Take 1,000 Units by mouth

## 2017-03-08 NOTE — PROGRESS NOTES
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 Whyte)    Subjective: Just starting. No issues so far. Chin incision has healed.     Nursing ROS:   Nutrition Alteration  Diet Type: Patient's Preference  Nutrition Note: patient has PEG, 2-4 cans of Isosource daily depending on oral intake  Skin  Skin Reaction: 0 - No changes  Skin Intervention: skin changes and skin cares reviewed, Aquaphor samples provided, hair loss reviewed     ENT and Mouth Exam  ENT/Mouth Note: reviewed baking soda and salt rinse, reviewed oral cares, has Biotene products, follows with speech therapy and home exercises through [facility]           Psychosocial  Mood - Anxiety: 0 - Normal  Mood - Depression: 0 - Normal  Pyschosocial Note: energy level at baseline  Pain Assessment  0-10 Pain Scale: 0    Objective:   Wt 164 lb  BMI 22.24 kg/m2  Gen: Appears well, NAD  Skin: No erythema    Labs:  CBC RESULTS: No results for input(s): WBC, RBC, HGB, HCT, MCV, MCH, MCHC, RDW, PLT in the last 50047 hours.  ELECTROLYTES:  No results for input(s): NA, POTASSIUM, CHLORIDE, JESSI, CO2, BUN, CR, GLC in the last 11990 hours.    Assessment:    Tolerating radiation therapy well.  All questions and concerns addressed.    Plan:   1. Continue current therapy.    2. Discussed plan, nutrition, skin and oral cares.      Mosaiq chart and setup information reviewed  IGRT images reviewed    Medication Review  Med list reviewed with patient?: Yes  Med list printed and given: Offered and  declined    Educational Topic Discussed  Education Instructions: Radiation therapy side effects: fatigue, skin changes and skin cares, hair loss, dry mouth and thick saliva, mouth and throat sores, taste changes, pain/difficulty with swallowing      Quique Magdaleno M.D.  [facility]  Radiation Oncology    [phone] [facility]  [phone] pager

## 2017-03-10 NOTE — PROGRESS NOTES
Received telephone call from patient reporting he woke up this morning experiencing swelling of his right face, discussed could be related to inflammation from treatment, reviewed use of Ibuprofen, cool packs and sucking on lemon drops to stimulate salivary production.  Reviewed with Dr. Magdaleno, agreeable with plan, would like to see patient prior to daily radiation treatment today.  Patient notified and agreeable, will arrive at 1215 for 1230 treatment.  Patient had no further questions at this time.    Padma Hogue RN BSN OCN

## 2017-03-16 NOTE — PATIENT INSTRUCTIONS
Please contact [facility] RN with questions or concerns following today's appointment: [phone].    Thank you!

## 2017-03-16 NOTE — PROGRESS NOTES
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 Whyte)    Subjective: No issues this week. Skin is more red. Slight soreness in throat but still eating and drinking well.  Supplementing via PEG    Nursing ROS:   Nutrition Alteration  Diet Type: Patient's Preference  Nutrition Note: patient has PEG, 2-4 cans of Isosource daily depending on oral intake  Skin  Skin Reaction: 1 - Faint erythema or dry desquamation  Skin Intervention: skin changes and skin cares reviewed again     ENT and Mouth Exam  ENT/Mouth Note: reviewed baking soda and salt rinse, reviewed oral cares, has Biotene products, follows with speech therapy and home exercises through [facility]           Psychosocial  Mood - Anxiety: 0 - Normal  Mood - Depression: 0 - Normal  Pyschosocial Note: energy level at baseline  Pain Assessment  0-10 Pain Scale: 0    Objective:   Wt 164 lb  BMI 22.24 kg/m2  Gen: Appears well, NAD  Skin: Mild diffuse erythema over treatment field  OC/OP: Minimal mucositis, appears to have been biting buccal mucosa on rt side.  He attributes it to using therabite and numbness of that area.    Labs:  CBC RESULTS: No results for input(s): WBC, RBC, HGB, HCT, MCV, MCH, MCHC, RDW, PLT in the last 80392 hours.  ELECTROLYTES:  No results for input(s): NA, POTASSIUM, CHLORIDE, JESSI, CO2, BUN, CR, GLC in the last 09227 hours.    Assessment:    Tolerating radiation therapy well.  All questions and concerns addressed.    Plan:   1. Continue current therapy.        Mosaiq chart  and setup information reviewed  IGRT images reviewed    Medication Review  Med list reviewed with patient?: Yes  Med list printed and given: Offered and declined    Educational Topic Discussed  Additional Instructions: Follows up with speech therapy on 3/23       Quique Magdaleno M.D.  [facility]  Radiation Oncology    [phone] [facility]  [phone] pager

## 2017-03-23 NOTE — MR AVS SNAPSHOT
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    Thank you!          Follow-ups after your visit        Your next 10 appointments already scheduled     Mar 27, 2017  8:15 AM CDT   TREATMENT with RADIATION THERAPIST   [facility] ([facility])    [address] [facility] [address]   [phone]            Mar 28, 2017 12:30 PM CDT   TREATMENT with RADIATION THERAPIST   [facility] ([facility])    [address] [facility] [address]   [phone]            Mar 29, 2017  3:15 PM CDT   TREATMENT with RADIATION THERAPIST   [facility] ([facility])    [address] [facility] [address]   [phone]            Mar 29, 2017  3:45 PM CDT   on treatment visit with Altagracia Dumont MD   [facility] ([facility])    [address] [facility] [address]   [phone]            Mar 30, 2017  8:15 AM CDT   TREATMENT with RADIATION THERAPIST   [facility] ([facility])    [address] [facility] [address]   [phone]            Mar 31, 2017 12:30 PM CDT   TREATMENT with RADIATION THERAPIST   [facility] ([facility])    [address] [facility] [address]   [phone]            Apr 03, 2017 12:15 PM CDT   TREATMENT with RADIATION THERAPIST   [facility]  [facility] ([facility])    [address] [facility] [address]   [phone]            Apr 04, 2017 12:30 PM CDT   TREATMENT with RADIATION THERAPIST   [facility] ([facility])    [address] [facility] [address]   [phone]            Apr 05, 2017 12:15 PM CDT   TREATMENT with RADIATION THERAPIST   [facility] ([facility])    [address] [facility] [address]   [phone]            Apr 06, 2017 12:15 PM CDT   TREATMENT with RADIATION THERAPIST   [facility] ([facility])    [address] [address]   [phone]              Who to contact     If you have questions or need follow up information about today's clinic visit or your schedule please contact [facility] directly at [phone].  Normal or non-critical lab and imaging results will be communicated to you by Pose.comhart, letter or phone within 4 business days after the clinic has received the results. If you do not hear from us within 7 days, please contact the clinic through [email or web address] or phone. If you have a critical or abnormal lab result, we will notify you by phone as soon as possible.  Submit refill requests through [employer] or call your pharmacy and they will forward the refill request to us. Please allow 3 business days for your refill to be completed.          Additional Information About Your Visit        [employer] Information     [employer] is an electronic gateway that provides easy, online access to your medical records. With [employer], you can request a clinic appointment, read your test results, renew a prescription or communicate with your care team.     To sign up for [employer] visit the website at [email or web address]   You will be asked to enter the access code listed below, as well as some personal information. Please follow the directions to  create your username and password.     Your access code is: [email or web address]  Expires: 2017  2:59 PM     Your access code will  in 90 days. If you need help or a new code, please contact your [facility] or call [phone] for assistance.        Care EveryWhere ID     This is your Care EveryWhere ID. This could be used by other organizations to access your [place] medical records  [id number]        Your Vitals Were     BMI (Body Mass Index)                   22.1 kg/m2            Blood Pressure from Last 3 Encounters:   17 96/61   17 96/61    Weight from Last 3 Encounters:   17 163 lb   17 164 lb   17 164 lb              Today, you had the following     No orders found for display       Primary Care Provider Office Phone # Fax #    Rush Trevino [phone]       [place] [id number]  [place] [address]        Thank you!     Thank you for choosing [facility]  for your care. Our goal is always to provide you with excellent care. Hearing back from our patients is one way we can continue to improve our services. Please take a few minutes to complete the written survey that you may receive in the mail after your visit with us. Thank you!             Your Updated Medication List - Protect others around you: Learn how to safely use, store and throw away your medicines at www.disposemymeds.org.          This list is accurate as of: 3/23/17 11:59 PM.  Always use your most recent med list.                   Brand Name Dispense Instructions for use    AQUAPHOR EX          aspirin 81 MG chewable tablet      81 mg       famotidine 20 MG tablet    PEPCID     20 mg by Per G Tube route       FIBER PO      Administer 1 Dose through feeding tube 4 times daily. Bolus/syringe feed 6 cartons of Isosource 1.5 daily per G tube.?Bolus/syringe 1.5 carton with 60 ml water flush before and 60 ml water flush after  each feed - four times a day.?Provide additional 200 ml water flush three times a day for hydration.       FISH OIL PO      Take by mouth daily       LORazepam 1 MG tablet    ATIVAN    4 tablet    Take 0.5-1 tablets (0.5-1 mg) by mouth every 8 hours as needed for anxiety Take 30 minutes prior to departure.  Do not operate a vehicle after taking this medication       MULTI-VITAMINS Tabs      Take 1 tablet by mouth       PROBIOTIC DAILY PO          sulfamethoxazole-trimethoprim 800-160 MG per tablet    BACTRIM DS/SEPTRA DS     Reported on 3/8/2017       VITAMIN D-1000 MAX ST 1000 UNITS Tabs   Generic drug:  cholecalciferol      Take 1,000 Units by mouth

## 2017-03-23 NOTE — PATIENT INSTRUCTIONS
Please contact [facility] RN with questions or concerns following today's appointment: [phone].    Thank you!

## 2017-03-24 NOTE — PROGRESS NOTES
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 Whyte)    Subjective: Doing well. Has more mucositis this week. Also notes blister and wonders whether he pinched lip with therabyte.  Pain adequately controlled with nsaids. Has oxycodone but has yet to use it.  Weight overall stable.    Nursing ROS:   Nutrition Alteration  Diet Type: Patient's Preference  Nutrition Note: patient has PEG, 2-4 cans of Isosource daily depending on oral intake  Skin  Skin Reaction: 1 - Faint erythema or dry desquamation  Skin Intervention: skin changes and skin cares reviewed again  Skin Note: Using Aquaphor     ENT and Mouth Exam  ENT/Mouth Note: Pt doing soda and salt rinse, reviewed oral cares, has Biotene products, follows with speech therapy and home exercises through [facility]. Has blister on inside of lower lip - will have speech therapy assess today. Reports tongue feels raw.           Psychosocial  Mood - Anxiety: 0 - Normal  Mood - Depression: 0 - Normal  Pyschosocial Note: slight fatigue, energy level lower than baseline  Pain Assessment  0-10 Pain Scale: 3  Pain Descriptors: Sore, Discomfort  Pain Treatment: Alieve and/or Tylenol as needed  Pain Note: Pt reports shooting pain on right side of face that comes and goes and upper and lower gums and right ear are painful, tongue is raw feeling    Objective:   Wt 163 lb  BMI 22.1 kg/m2  Gen: Appears well, NAD  Skin: Mild diffuse erythema over treatment field  OC/OP: mucositis through out oral cavity, small 1 cm  blister on lower left lip.    Labs:  CBC RESULTS: No results for input(s): WBC, RBC, HGB, HCT, MCV, MCH, MCHC, RDW, PLT in the last 04132 hours.  ELECTROLYTES:  No results for input(s): NA, POTASSIUM, CHLORIDE, JESSI, CO2, BUN, CR, GLC in the last 26[address] hours.    Assessment:    Tolerating radiation therapy well.  All questions and concerns addressed.    Plan:   1. Continue current therapy.    2. Mucositis: continue with salt and soda, tylenol, discussed limiting therabyte until end of tx or at least being more careful with it.  3. Nutrition: Continue with supplements, po intake.      Mosaiq chart and setup information reviewed  IGRT images reviewed    Medication Review  Med list reviewed with patient?: Yes  Med list printed and given: Offered and declined    Educational Topic Discussed  Additional Instructions: Follows up with speech therapy on 3/23       Quique Magdaleno M.D.  [facility]  Radiation Oncology    [phone] clinic  [phone] pager

## 2017-03-29 NOTE — PROGRESS NOTES
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 Whyte)    Subjective:   Mr. Ospina is tolerating his treatments rather poorly this week. Complaining of increased phlegm, swelling, and dry mouth. Has not been sleeping well as he wakes up due to dry mouth vs. Nocturia (stable issue for many years). Notes that his mucositis is getting worse, but only refers to it as mouth sores, stating that he has a high threshold. Only taking rx ibuprofen. Has oxycodone at home form his surgery but has not been taking it.     Nursing ROS:   Nutrition Alteration  Diet Type: Patient's Preference  Nutrition Note: patient has PEG, 3-5 cans of Isosource, eating soft foods  Skin  Skin Reaction: 2 - Moderate to brisk erythema, patchy moist desquamation, mostly confined to skin folds and creases, moderate edema  Skin Intervention: patient using Aquaphor     ENT and Mouth Exam  ENT/Mouth Note: patient doing salt and soda rinse 1-2 times per day, discussed increasing to every 2 hours, using Biotene toothpaste, follows with speech therapy through [facility], concerned regarding right neck/facial swelling, reports lack of taste, reports painful right mouth sores       Psychosocial  Mood - Anxiety: 0 - Normal  Mood - Depression: 0 - Normal  Pyschosocial Note: increasing fatigue  Pain Assessment  0-10 Pain Scale: 4  Pain Descriptors: Sore  Pain Treatment: patient taking Aleve during day and prescription Ibuprofen at night      Objective:   Wt 162 lb  BMI 21.97 kg/m2  Appears  well, alert, oriented, and in NAD    Assessment:    Tolerating radiation therapy poorly this week.  Increased mouth sores and swelling, and dry mouth. All questions and concerns addressed.    Plan:   1. Continue current therapy.    2. Discussed taking his oxycodone q4 hours and seeing how that does  3. Increase frequency of salt and soda rinses  4. Encouraged hydration and nutrition intake  5. Declined magic mouthwash today      Mosaiq chart and setup information reviewed  Ports checked    Medication Review  Med list reviewed with patient?: Yes  Med list printed and given: Offered and declined       Altagracia Dumont MD  I saw the patient with the resident.  I agree with the resident note and plan of care.      Altagracia Dumont MD   [phone]

## 2017-03-29 NOTE — PATIENT INSTRUCTIONS
Please contact [facility] RN with questions or concerns following today's appointment: [phone].    Thank you!

## 2017-03-29 NOTE — MR AVS SNAPSHOT
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    Thank you!          Follow-ups after your visit        Your next 10 appointments already scheduled     Mar 30, 2017  8:15 AM CDT   TREATMENT with RADIATION THERAPIST   [facility] ([facility])    [address] [facility] [address]   [phone]            Mar 31, 2017 12:30 PM CDT   TREATMENT with RADIATION THERAPIST   [facility] ([facility])    [address] [facility] [address]   [phone]            Apr 03, 2017 12:15 PM CDT   TREATMENT with RADIATION THERAPIST   [facility] ([facility])    [address] [facility] [address]   [phone]            Apr 04, 2017 12:30 PM CDT   TREATMENT with RADIATION THERAPIST   [facility] ([facility])    [address] [facility] [address]   [phone]            Apr 05, 2017 12:15 PM CDT   TREATMENT with RADIATION THERAPIST   [facility] ([facility])    [address] [facility] [address]   [phone]            Apr 06, 2017 12:15 PM CDT   TREATMENT with RADIATION THERAPIST   [facility] ([facility])    [address] [facility] [address]   [phone]            Apr 06, 2017 12:45 PM CDT   on treatment visit with Quique Magdaleno MD   [facility] ([facility])    [address] [facility] [address]   [phone]            Apr 07, 2017 12:15 PM CDT   TREATMENT with RADIATION THERAPIST   [facility] ([facility])    [address] [facility] [address]   [phone]            Apr 10, 2017 12:15 PM CDT   TREATMENT with RADIATION THERAPIST   [facility] ([facility])    [address] [facility] [address]   [phone]            Apr 11, 2017 12:30 PM CDT   TREATMENT with RADIATION THERAPIST   [facility] ([facility])    [address] [place] [address]   [phone]              Who to contact     If you have questions or need follow up information about today's clinic visit or your schedule please contact [facility] directly at [phone].  Normal or non-critical lab and imaging results will be communicated to you by Doppelgangerhart, letter or phone within 4 business days after the clinic has received the results. If you do not hear from us within 7 days, please contact the clinic through Doppelgangerhart or phone. If you have a critical or abnormal lab result, we will notify you by phone as soon as possible.  Submit refill requests through Numonyx or call your pharmacy and they will forward the refill request to us. Please allow 3 business days for your refill to be completed.          Additional Information About Your Visit        Numonyx Information     Numonyx is an electronic gateway that provides easy, online access to your medical records. With Numonyx, you can request a clinic appointment, read your test results, renew a prescription or communicate with your care team.     To sign up for Numonyx visit the website at [email or web address]   You will be asked to enter the access code listed below, as well as some personal information. Please follow the directions to  create your username and password.     Your access code is: [email or web address]  Expires: 2017  2:59 PM     Your access code will  in 90 days. If you need help or a new code, please contact your [facility] or call [phone] for assistance.        Care EveryWhere ID     This is your Care EveryWhere ID. This could be used by other organizations to access your [place] medical records  [id number]        Your Vitals Were     BMI (Body Mass Index)                   21.97 kg/m2            Blood Pressure from Last 3 Encounters:   17 96/61   17 96/61    Weight from Last 3 Encounters:   17 162 lb   17 163 lb   17 164 lb              Today, you had the following     No orders found for display       Primary Care Provider Office Phone # Fax #    Rush Trevino [phone]       [place] [id number]  [place] [address]        Thank you!     Thank you for choosing [facility]  for your care. Our goal is always to provide you with excellent care. Hearing back from our patients is one way we can continue to improve our services. Please take a few minutes to complete the written survey that you may receive in the mail after your visit with us. Thank you!             Your Updated Medication List - Protect others around you: Learn how to safely use, store and throw away your medicines at www.disposemymeds.org.          This list is accurate as of: 3/29/17  4:29 PM.  Always use your most recent med list.                   Brand Name Dispense Instructions for use    AQUAPHOR EX          aspirin 81 MG chewable tablet      81 mg       famotidine 20 MG tablet    PEPCID     20 mg by Per G Tube route       FIBER PO      Administer 1 Dose through feeding tube 4 times daily. Bolus/syringe feed 6 cartons of Isosource 1.5 daily per G tube.?Bolus/syringe 1.5 carton with 60 ml water flush before and 60 ml water flush after  each feed - four times a day.?Provide additional 200 ml water flush three times a day for hydration.       FISH OIL PO      Take by mouth daily       LORazepam 1 MG tablet    ATIVAN    4 tablet    Take 0.5-1 tablets (0.5-1 mg) by mouth every 8 hours as needed for anxiety Take 30 minutes prior to departure.  Do not operate a vehicle after taking this medication       MULTI-VITAMINS Tabs      Take 1 tablet by mouth       PROBIOTIC DAILY PO          sulfamethoxazole-trimethoprim 800-160 MG per tablet    BACTRIM DS/SEPTRA DS     Reported on 3/8/2017       VITAMIN D-1000 MAX ST 1000 UNITS Tabs   Generic drug:  cholecalciferol      Take 1,000 Units by mouth

## 2017-03-31 NOTE — PROGRESS NOTES
Received telephone call from patient reporting he has noticed a lump behind his right ear that has been increasing in size over the past two days, he reports concerns regarding new lump.  Offered visit with patient and Dr. Maxwell prior to daily radiation treatment today, 3/31/2017 at 1200.  Patient agreeable with plan and confirmed appointment date and time.    Padma Hogue RN BSN OCN

## 2017-03-31 NOTE — TELEPHONE ENCOUNTER
Mr. Ospina was seen prior to treatment today, complaining of an increasing right sided lump that developed quite rapidly over the past few days. It is tender and red, but he denies any fevers or chills. Located at the most lateral aspect of his surgical scar, the size of a half dollar coin, and filled with fluid with surrounding fibrosis. It is quite tender to touch. This is concerning for infection. We made a call to Dr. Christine's office to hopefully have this evaluated, and left a message. Given how tender his neck is currently, patient does not feel he can be treated with the treatment mask today. We will hold off on RT today and he will return on Monday to resume treatment. Encouraged to call with any other questions or concerns int he interim.     Kemal Maxwell MD  Resident Radiation Oncology

## 2017-04-04 NOTE — PROGRESS NOTES
Received telephone call from patient reporting that he was seen by Dr. Christine today for follow-up.  Right neck site still draining, patient currently on antibiotics after aspiration last week with Dr. Christine.  Patient reports per Dr. Christine, should not receive radiation therapy this week and able to resume on Monday, 4/10/2017.  Patient requests to be cancelled for Wednesday through Friday, 4/5/2017 through 4/7/2017.  Informed patient that Dr. Magdaleno and radiation therapists will be notified.  Patient scheduled to see Dr. Christine on Friday, 4/7/2017 for follow-up and patient instructed to contact this RN if change in plan, otherwise confirmed with patient scheduled to resume treatment on Monday, 4/10/2017 at 1215.  Patient verbalized understanding of all information and had no questions at this time.    Padma Hogue, RN BSN OCN

## 2017-04-07 NOTE — PROGRESS NOTES
Received telephone call from patient reporting he was seen by Dr. Christine today, packing removed, small band-aid in place, received okay from Dr. Christine to restart radiation therapy.  Patient scheduled for Monday, 4/10/2017 at 1215, patient confirmed appointment date and time.  Patient also reports that he knows he has treatments to finish, however, he would like to be done with his radiation treatment on 4/21/2017.  Informed patient that this would need to be discussed with Dr. Magdaleno and weekly on-treatment visit scheduled on Monday, 4/10/2017 after daily radiation treatment at 1245.  Patient verbalized understanding of all information and had no questions or concerns at this time.    Padma Hogue, RN BSN OCN

## 2017-04-10 NOTE — MR AVS SNAPSHOT
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    Thank you!          Follow-ups after your visit        Your next 10 appointments already scheduled     Apr 12, 2017 12:15 PM CDT   TREATMENT with RADIATION THERAPIST   [facility] ([facility])    [address] [facility] [address]   [phone]            Apr 13, 2017 12:15 PM CDT   TREATMENT with RADIATION THERAPIST   [facility] ([facility])    [address] [facility] [address]   [phone]            Apr 13, 2017 12:45 PM CDT   on treatment visit with Quique Magdaleno MD   [facility] ([facility])    [address] [facility] [address]   [phone]            Apr 14, 2017 12:15 PM CDT   TREATMENT with RADIATION THERAPIST   [facility] ([facility])    [address] [facility] [address]   [phone]            Apr 17, 2017 12:15 PM CDT   TREATMENT with RADIATION THERAPIST   [facility] ([facility])    [address] [facility] [address]   [phone]            Apr 18, 2017 12:15 PM CDT   TREATMENT with RADIATION THERAPIST   [facility] ([facility])    [address] [facility] [address]   [phone]            Apr 19, 2017 12:15 PM CDT   TREATMENT with RADIATION THERAPIST   [facility] ([facility])    [address] [place] [address]   [phone]            Apr 20, 2017 12:15 PM CDT   TREATMENT with RADIATION THERAPIST   [facility] ([facility])    [address] [address]   [phone]            Apr 20, 2017 12:45 PM CDT   on treatment visit with Quique Magdaleno MD   [facility] ([facility])    [address] [place] [address]   [phone]            Apr 21, 2017 12:15 PM CDT   TREATMENT with RADIATION THERAPIST   [facility] ([facility])    [address] [place] [address] [phone]              Who to contact     If you have questions or need follow up information about today's clinic visit or your schedule please contact [facility] directly at [phone].  Normal or non-critical lab and imaging results will be communicated to you by Teraco Data Environmentshart, letter or phone within 4 business days after the clinic has received the results. If you do not hear from us within 7 days, please contact the clinic through Teraco Data Environmentshart or phone. If you have a critical or abnormal lab result, we will notify you by phone as soon as possible.  Submit refill requests through Sprinkle or call your pharmacy and they will forward the refill request to us. Please allow 3 business days for your refill to be completed.          Additional Information About Your Visit        Sprinkle Information     Sprinkle is an electronic gateway that provides easy, online access to your medical records. With Sprinkle, you can request a clinic appointment, read your test results, renew a prescription or communicate with your care team.     To sign up for Sprinkle visit the website at [email or web address]   You will be asked to enter the access code listed below, as well as some personal information. Please  follow the directions to create your username and password.     Your access code is: [email or web address]  Expires: 2017  2:59 PM     Your access code will  in 90 days. If you need help or a new code, please contact your [facility] or call [phone] for assistance.        Care EveryWhere ID     This is your Care EveryWhere ID. This could be used by other organizations to access your [place] medical records  [id number]        Your Vitals Were     BMI (Body Mass Index)                   21.7 kg/m2            Blood Pressure from Last 3 Encounters:   17 96/61   17 96/61    Weight from Last 3 Encounters:   04/10/17 160 lb   17 162 lb   17 163 lb              Today, you had the following     No orders found for display       Primary Care Provider Office Phone # Fax #    Rush COOPER Uriel [phone]       [place] [id number]  [place] [address]        Thank you!     Thank you for choosing [facility]  for your care. Our goal is always to provide you with excellent care. Hearing back from our patients is one way we can continue to improve our services. Please take a few minutes to complete the written survey that you may receive in the mail after your visit with us. Thank you!             Your Updated Medication List - Protect others around you: Learn how to safely use, store and throw away your medicines at www.disposemymeds.org.          This list is accurate as of: 4/10/17 11:59 PM.  Always use your most recent med list.                   Brand Name Dispense Instructions for use    amoxicillin-clavulanate 600-42.9 MG/5ML suspension    AUGMENTIN-ES     TK 7.5 ML PO BID UTD       AQUAPHOR EX          aspirin 81 MG chewable tablet      81 mg       famotidine 20 MG tablet    PEPCID     20 mg by Per G Tube route       FIBER PO      Administer 1 Dose through feeding tube 4 times daily. Bolus/syringe feed 6  cartons of Isosource 1.5 daily per G tube.?Bolus/syringe 1.5 carton with 60 ml water flush before and 60 ml water flush after each feed - four times a day.?Provide additional 200 ml water flush three times a day for hydration.       FISH OIL PO      Take by mouth daily       LORazepam 1 MG tablet    ATIVAN    4 tablet    Take 0.5-1 tablets (0.5-1 mg) by mouth every 8 hours as needed for anxiety Take 30 minutes prior to departure.  Do not operate a vehicle after taking this medication       MULTI-VITAMINS Tabs      Take 1 tablet by mouth       PROBIOTIC DAILY PO          sulfamethoxazole-trimethoprim 800-160 MG per tablet    BACTRIM DS/SEPTRA DS     Reported on 3/8/2017       VITAMIN D-1000 MAX ST 1000 UNITS Tabs   Generic drug:  cholecalciferol      Take 1,000 Units by mouth

## 2017-04-10 NOTE — PATIENT INSTRUCTIONS
Please contact [facility] RN with questions or concerns following today's appointment: [phone].    Thank you!

## 2017-04-11 NOTE — PROGRESS NOTES
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 Whyte)    Subjective: Just returning to restart radiation.  Had a break due to seroma/abscess that was drained. Pt has been on abx. Area continues to drain but has significantly improved and has not reaccumulated. As far as radiation goes, he is overall doing well. Slight mucositis with some pain with swallowing. Dry mouth improved with biotene sprays. Patient wants to complete radiation early and wished not to do last 2 treatments.    Nursing ROS:   Nutrition Alteration  Diet Type: Patient's Preference  Nutrition Note: patient has PEG, 3-5 cans of Isosource, eating soft foods  Skin  Skin Reaction: 2 - Moderate to brisk erythema, patchy moist desquamation, mostly confined to skin folds and creases, moderate edema  Skin Intervention: patient using Aquaphor     ENT and Mouth Exam  ENT/Mouth Note: patient doing salt and soda rinse, using Biotene spray, notes improvement in right mouth pain, follows with speech therapy through [facility], follows with lymphedema therapy           Psychosocial  Mood - Anxiety: 0 - Normal  Mood - Depression: 0 - Normal  Pyschosocial Note: slight fatigue  Pain Assessment  0-10 Pain Scale: 2  Pain Descriptors: Sore (right mouth/tongue)  Pain Treatment: patient taking Aleve during day and prescription Ibuprofen at night    Objective:   Wt 160 lb  BMI 21.7 kg/m2  Gen: Appears well, NAD  Skin: Mild diffuse erythema over treatment field, small opening in rt neck with minimal  drainage  OC/OL: Looks quite good with very mid mucositis    Labs:  CBC RESULTS: No results for input(s): WBC, RBC, HGB, HCT, MCV, MCH, MCHC, RDW, PLT in the last 15729 hours.  ELECTROLYTES:  No results for input(s): NA, POTASSIUM, CHLORIDE, JESSI, CO2, BUN, CR, GLC in the last 49[address] hours.    Assessment:    Tolerating radiation therapy well.  All questions and concerns addressed.    Plan:   1. Continue current therapy.  Will restart treatment today. Discussed impact of break in treatment and potential for worse outcomes. Recommended he complete course of treatment and even would consider a boost given break and CONRAD. Discussed risk of recurrence can be higher with incomplete treatment and patient understood. He is adamant about finishing treatment 7/21.  2. Will continue skin and oral cares.      Mosaiq chart and setup information reviewed  IGRT images reviewed    Medication Review  Med list reviewed with patient?: Yes  Med list printed and given: Offered and declined    Educational Topic Discussed  Education Instructions: follow-up with Dr. Kumar on 4/19/2017, follow-up with Dr. Christine one month after completion of radiation      Quique Magdaleno M.D.  [facility]  Radiation Oncology    [phone] clinic  [phone] pager

## 2017-04-13 NOTE — PATIENT INSTRUCTIONS
Please contact [facility] RN with questions or concerns following today's appointment: [phone].    Thank you!

## 2017-04-13 NOTE — MR AVS SNAPSHOT
After Visit Summary   4/13/2017    Quirino Ospina    MRN: [id number]           Patient Information     Date Of Birth          1940        Visit Information        Provider Department      4/13/2017 12:45 PM Quique Magdaleno MD [facility]        Today's Diagnoses     Disturbance of salivary secretion    -  1      Care Instructions    Please contact [facility] RN with questions or concerns following today's appointment: [phone].    Thank you!          Follow-ups after your visit        Your next 10 appointments already scheduled     Apr 14, 2017 12:15 PM CDT   TREATMENT with RADIATION THERAPIST   [facility] ([facility])    [address] [facility] [address]   [phone]            Apr 17, 2017 12:15 PM CDT   TREATMENT with RADIATION THERAPIST   [facility] ([facility])    [address] [facility] [address]   [phone]            Apr 18, 2017 12:15 PM CDT   TREATMENT with RADIATION THERAPIST   [facility] ([facility])    [address] [facility] [address]   [phone]            Apr 19, 2017 12:15 PM CDT   TREATMENT with RADIATION THERAPIST   [facility] ([facility])    [address] [facility] [address]   [phone]            Apr 20, 2017 12:15 PM CDT   TREATMENT with RADIATION THERAPIST   [facility] ([facility])    [address] [facility] [address]   [phone]            Apr 20, 2017 12:45 PM CDT   on treatment visit with Quique Magdaleno MD   [facility] ([facility])    [address] [facility] [address]   [phone]            Apr 21, 2017 12:15 PM CDT   TREATMENT with RADIATION THERAPIST   [facility] ([facility])    [address] [place] [address]   [phone]            Apr 24, 2017 12:15 PM CDT   TREATMENT with RADIATION THERAPIST   [facility] ([facility])    [address] [address]   [phone]            Apr 25, 2017 12:15 PM CDT   TREATMENT with RADIATION THERAPIST   [facility] ([facility])    [address] [place] [address]   [phone]            Apr 25, 2017 12:45 PM CDT   on treatment visit with Quique Magdaleno MD   [facility] ([facility])    [address] [place] [address] [phone]              Who to contact     If you have questions or need follow up information about today's clinic visit or your schedule please contact [facility] directly at [phone].  Normal or non-critical lab and imaging results will be communicated to you by [email or web address], letter or phone within 4 business days after the clinic has received the results. If you do not hear from us within 7 days, please contact the clinic through [email or web address] or phone. If you have a critical or abnormal lab result, we will notify you by phone as soon as possible.  Submit refill requests through Besstech or call your pharmacy and they will forward the refill request to us. Please allow 3 business days for your refill to be completed.          Additional Information About Your Visit        Besstech Information     Besstech is an electronic gateway that provides easy, online access to your medical records. With Besstech, you can request a clinic appointment, read your test results, renew a prescription or communicate with your care team.     To sign up for Besstech visit the website at [email or web address]   You will be asked to enter the access code listed below, as well as some personal information. Please follow the directions to create your  username and password.     Your access code is: [email or web address]  Expires: 2017  2:59 PM     Your access code will  in 90 days. If you need help or a new code, please contact your [facility] or call [phone] for assistance.        Care EveryWhere ID     This is your Care EveryWhere ID. This could be used by other organizations to access your [facility] medical records  [id number]        Your Vitals Were     BMI (Body Mass Index)                   21.56 kg/m2            Blood Pressure from Last 3 Encounters:   17 96/61   17 96/61    Weight from Last 3 Encounters:   17 159 lb   04/10/17 160 lb   17 162 lb              Today, you had the following     No orders found for display         Today's Medication Changes          These changes are accurate as of: 17  3:29 PM.  If you have any questions, ask your nurse or doctor.               Start taking these medicines.        Dose/Directions    cevimeline 30 MG capsule   Commonly known as:  EVOXAC   Used for:  Disturbance of salivary secretion   Started by:  Quique Magdaleno MD        Dose:  30 mg   Take 1 capsule (30 mg) by mouth 3 times daily   Quantity:  90 capsule   Refills:  0            Where to get your medicines      These medications were sent to [facility] [address]  [place], [place] - [address]  AT [facility] [address]  [address] , [place] [address]     Phone:  [phone]     cevimeline 30 MG capsule                Primary Care Provider Office Phone # Fax #    Rush ALLISON Trevino [phone]       [facility] [address] [address]  [place] [address]        Thank you!     Thank you for choosing [facility]  for your care. Our goal is always to provide you with excellent care. Hearing back from our patients is one way we can continue to improve our services. Please take a few minutes to complete the written survey that  you may receive in the mail after your visit with us. Thank you!             Your Updated Medication List - Protect others around you: Learn how to safely use, store and throw away your medicines at www.disposemymeds.org.          This list is accurate as of: 4/13/17  3:29 PM.  Always use your most recent med list.                   Brand Name Dispense Instructions for use    amoxicillin-clavulanate 600-42.9 MG/5ML suspension    AUGMENTIN-ES     TK 7.5 ML PO BID UTD       AQUAPHOR EX          aspirin 81 MG chewable tablet      81 mg       cevimeline 30 MG capsule    EVOXAC    90 capsule    Take 1 capsule (30 mg) by mouth 3 times daily       famotidine 20 MG tablet    PEPCID     20 mg by Per G Tube route       FIBER PO      Administer 1 Dose through feeding tube 4 times daily. Bolus/syringe feed 6 cartons of Isosource 1.5 daily per G tube.?Bolus/syringe 1.5 carton with 60 ml water flush before and 60 ml water flush after each feed - four times a day.?Provide additional 200 ml water flush three times a day for hydration.       FISH OIL PO      Take by mouth daily       LORazepam 1 MG tablet    ATIVAN    4 tablet    Take 0.5-1 tablets (0.5-1 mg) by mouth every 8 hours as needed for anxiety Take 30 minutes prior to departure.  Do not operate a vehicle after taking this medication       MULTI-VITAMINS Tabs      Take 1 tablet by mouth       PROBIOTIC DAILY PO          sulfamethoxazole-trimethoprim 800-160 MG per tablet    BACTRIM DS/SEPTRA DS     Reported on 3/8/2017       VITAMIN D-1000 MAX ST 1000 UNITS Tabs   Generic drug:  cholecalciferol      Take 1,000 Units by mouth

## 2017-04-13 NOTE — PROGRESS NOTES
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 Whyte)    Subjective: Doing well. No pain or difficulty with swallowing. Biggest concern is dry mouth. Does artificial saliva and salt and soda rinses.    Nursing ROS:   Nutrition Alteration  Diet Type: Patient's Preference  Nutrition Note: patient has PEG, 3-5 cans of Isosource, eating soft foods  Skin  Skin Intervention: patient using Aquaphor       Objective:   Wt 159 lb  BMI 21.56 kg/m2  Gen: Appears well, NAD  Skin: Mild diffuse erythema over treatment field  OC/OP: Mild erythema    Labs:  CBC RESULTS: No results for input(s): WBC, RBC, HGB, HCT, MCV, MCH, MCHC, RDW, PLT in the last 02504 hours.  ELECTROLYTES:  No results for input(s): NA, POTASSIUM, CHLORIDE, JESSI, CO2, BUN, CR, GLC in the last 42777 hours.    Assessment:    Tolerating radiation therapy well.  All questions and concerns addressed.    Plan:   1. Continue current therapy.    2. Xerostomia: Continue with salt and soda, artificial saliva, will start evoxac.      Mosaiq chart and setup information reviewed  IGRT images reviewed         Educational Topic Discussed  Education Instructions: follow-up with Dr. Kumar on 2017, follow-up with Dr. Christine one month after completion of radiation      Quique Magdaleno M.D.  [facility]  Radiation Oncology    [phone] [facility]  [phone] pager

## 2017-04-17 NOTE — MR AVS SNAPSHOT
After Visit Summary   4/17/2017    Quirino Ospina    MRN: [id number]           Patient Information     Date Of Birth          1940        Visit Information        Provider Department      4/17/2017 11:45 AM Quique Magdaleno MD [facility]        Today's Diagnoses     Abscess of neck    -  1      Care Instructions    [facility] contact [facility] RN with questions or concerns following today's appointment: [phone].    Thank you!          Follow-ups after your visit        Your next 10 appointments already scheduled     Apr 19, 2017 12:15 PM CDT   TREATMENT with RADIATION THERAPIST   [facility] ([facility])    [address] [facility] [address]   [phone]            Apr 20, 2017 12:15 PM CDT   TREATMENT with RADIATION THERAPIST   [facility] ([facility])    [address] [facility] [address]   [phone]            Apr 20, 2017 12:45 PM CDT   on treatment visit with Quique Magdaleno MD   [facility] ([facility])    [address] [facility] [address]   [phone]            Apr 21, 2017 12:15 PM CDT   TREATMENT with RADIATION THERAPIST   [facility] ([facility])    [address] [facility] [address]   [phone]            Apr 24, 2017 12:15 PM CDT   TREATMENT with RADIATION THERAPIST   [facility] ([facility])    [address] [facility] [address]   [phone]            Apr 25, 2017 12:45 PM CDT   on treatment visit with Quique Magdaleno MD   [facility] ([facility])    [address] [facility] [address]   [phone]            Apr 25, 2017  1:00 PM CDT   TREATMENT with RADIATION THERAPIST   [facility] ([facility])    [address] [place] [address] [phone]            2017 12:30 PM CDT   TREATMENT with RADIATION THERAPIST   [facility] ([facility]    [address] [place] [address] [phone]              Who to contact     If you have questions or need follow up information about today's clinic visit or your schedule please contact [facility] directly at [phone].  Normal or non-critical lab and imaging results will be communicated to you by [employer], letter or phone within 4 business days after the clinic has received the results. If you do not hear from us within 7 days, please contact the clinic through [employer] or phone. If you have a critical or abnormal lab result, we will notify you by phone as soon as possible.  Submit refill requests through ThinkHR or call your pharmacy and they will forward the refill request to us. Please allow 3 business days for your refill to be completed.          Additional Information About Your Visit        ThinkHR Information     ThinkHR is an electronic gateway that provides easy, online access to your medical records. With ThinkHR, you can request a clinic appointment, read your test results, renew a prescription or communicate with your care team.     To sign up for ThinkHR visit the website at [email or web address]   You will be asked to enter the access code listed below, as well as some personal information. Please follow the directions to create your username and password.     Your access code is: [email or web address]  Expires: 2017  2:59 PM     Your access code will  in 90 days. If you need help or a new code, please contact your [facility] or call [phone] for assistance.        Care EveryWhere ID     This is your Care EveryWhere ID. This could be used by other organizations to access your [facility]  records  [id number]         Blood Pressure from Last 3 Encounters:   02/22/17 96/61   02/22/17 96/61    Weight from Last 3 Encounters:   04/13/17 159 lb   04/10/17 160 lb   03/29/17 162 lb              We Performed the Following     Wound Culture Aerobic Bacterial        Primary Care Provider Office Phone # Fax #    Rush Trevino [phone]       [facility] [address]  [place] [address]        Thank you!     Thank you for choosing [facility]  for your care. Our goal is always to provide you with excellent care. Hearing back from our patients is one way we can continue to improve our services. Please take a few minutes to complete the written survey that you may receive in the mail after your visit with us. Thank you!             Your Updated Medication List - Protect others around you: Learn how to safely use, store and throw away your medicines at www.disposemymeds.org.          This list is accurate as of: 4/17/17 11:59 PM.  Always use your most recent med list.                   Brand Name Dispense Instructions for use    amoxicillin-clavulanate 600-42.9 MG/5ML suspension    AUGMENTIN-ES     TK 7.5 ML PO BID UTD       AQUAPHOR EX          aspirin 81 MG chewable tablet      81 mg       cevimeline 30 MG capsule    EVOXAC    90 capsule    Take 1 capsule (30 mg) by mouth 3 times daily       famotidine 20 MG tablet    PEPCID     20 mg by Per G Tube route       FIBER PO      Administer 1 Dose through feeding tube 4 times daily. Bolus/syringe feed 6 cartons of Isosource 1.5 daily per G tube.?Bolus/syringe 1.5 carton with 60 ml water flush before and 60 ml water flush after each feed - four times a day.?Provide additional 200 ml water flush three times a day for hydration.       FISH OIL PO      Take by mouth daily       LORazepam 1 MG tablet    ATIVAN    4 tablet    Take 0.5-1 tablets (0.5-1 mg) by mouth every 8 hours as needed for anxiety Take 30 minutes prior  to departure.  Do not operate a vehicle after taking this medication       MULTI-VITAMINS Tabs      Take 1 tablet by mouth       PROBIOTIC DAILY PO          sulfamethoxazole-trimethoprim 800-160 MG per tablet    BACTRIM DS/SEPTRA DS     Reported on 3/8/2017       VITAMIN D-1000 MAX ST 1000 UNITS Tabs   Generic drug:  cholecalciferol      Take 1,000 Units by mouth

## 2017-04-17 NOTE — PATIENT INSTRUCTIONS
Please contact [facility] RN with questions or concerns following today's appointment: [phone].    Thank you!

## 2017-04-18 NOTE — PROGRESS NOTES
"Received telephone call from patient reporting, \"I have had large amounts of drainage from my neck last night and this morning and have saturated the band-aids\".  Patient reports, \"I do believe that my body is telling me that I need to be done with radiation.  I will not be coming in today and I am not sure about tomorrow.\"  Patient notes that he has a telephone call out to Dr. Christine to further discuss and he is scheduled to see Dr. Kumar tomorrow, 4/19/2017.  Patient also notes that he is scheduled for weekly visit with Dr. Magdaleno, \"if I don't have treatment this week, maybe I will come in for that visit to discuss with Dr. Magdaleno\".  Informed patient that information would be relayed to Dr. Magdaleno.  Patient had no questions at this time.    Padma Hogue RN BSN OCN  "

## 2017-04-19 NOTE — PROGRESS NOTES
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 Whyte)    Subjective: Mr. Pimentel notices some increased drainage of his neck since last week.  Otherwise, doing well.  No issues with eating and drinking. Pain well controlled.    Nursing ROS:   Nutrition Alteration  Diet Type: Patient's Preference  Nutrition Note: patient has PEG, 3-5 cans of Isosource, eating soft foods  Skin  Skin Intervention: patient using Aquaphor     Objective:   There were no vitals taken for this visit.  Gen: Appears well, NAD  Skin: Mild diffuse erythema over treatment field, small opening in neck incision unchaged, slight serosanguinous drainage.  No palpable seroma or abscess or infection.  OC/OP: Mild mucosa of orpharynx, ctd trismus  Labs:  CBC RESULTS: No results for input(s): WBC, RBC, HGB, HCT, MCV, MCH, MCHC, RDW, PLT in the last 18496 hours.  ELECTROLYTES:  No results for input(s): NA, POTASSIUM, CHLORIDE, JESSI, CO2, BUN, CR, GLC in the last 500[address] hours.    Assessment:    Tolerating radiation therapy well.  All questions and concerns addressed.    Plan:   1. Continue current therapy.  Discussed importance of no additional breaks, and again completing planned course and not stopping treatment early.  2. Discussed drainage will likely continue will getting radiation. Would not recommend taking any more breaks in radation and complete it then this should eventually hopefully close. No signs of infection, however did culture some more fluid that was  josi      Mosaiq chart and setup information reviewed  IGRT images reviewed         Educational Topic Discussed  Education Instructions: follow-up with Dr. Kumar on 4/19/2017, follow-up with Dr. Christine one month after completion of radiation      Quique Magdaleno M.D.  [facility]  Radiation Oncology    [phone] [facility]  [phone] pager

## 2017-04-19 NOTE — PROGRESS NOTES
"Received telephone message from patient today, 4/19/2017 at 1058 stating \"I just got done with my appointment with Dr. Kumar and I definitely have an infection, they removed half of a syringe of pus\".  Patient reports that he will not be having radiation treatment today, \"I'm not sure if I will ever have it anymore\".  This RN returned call to patient, reports that Dr. Kumar removed fluid \"from a slightly different spot then it was before\".  He reports he was not started on antibiotics and Dr. Kumar is waiting for culture.  Patient reports, \"I am inclined to say that I am done with radiation treatment and I know for sure I will not have any treatment today or tomorrow\".  Patient reports he would like to be seen by Dr. Magdaleno on Thursday, 4/20/2017 for a final visit for radiation treatment, this appointment was scheduled for 1300 on 4/20/2017.  Patient verbalized understanding of information, confirmed appointment date and time and had no further questions at this time.  Dr. Magdaleno and radiation therapists notified.    Padma Hogue, RN BSN OCN  "

## 2017-04-20 NOTE — MR AVS SNAPSHOT
After Visit Summary   4/20/2017    Quirino Ospina    MRN: [id number]           Patient Information     Date Of Birth          1940        Visit Information        Provider Department      4/20/2017 1:00 PM Quique Magdaleno MD [facility]        Today's Diagnoses     Disturbance of salivary secretion    -  1      Care Instructions    Please contact [facility] RN with questions or concerns following today's appointment: [phone].    Thank you!          Follow-ups after your visit        Your next 10 appointments already scheduled     May 05, 2017  9:30 AM CDT   Return Visit with Quique Magdaleno MD   [facility] ([facility]    [address] [place] [id number] [phone]              Who to contact     If you have questions or need follow up information about today's clinic visit or your schedule please contact [facility] directly at [phone].  Normal or non-critical lab and imaging results will be communicated to you by eWave Interactivehart, letter or phone within 4 business days after the clinic has received the results. If you do not hear from us within 7 days, please contact the clinic through eWave Interactivehart or phone. If you have a critical or abnormal lab result, we will notify you by phone as soon as possible.  Submit refill requests through EG Technology or call your pharmacy and they will forward the refill request to us. Please allow 3 business days for your refill to be completed.          Additional Information About Your Visit        MyChart Information     EG Technology is an electronic gateway that provides easy, online access to your medical records. With EG Technology, you can request a clinic appointment, read your test results, renew a prescription or communicate with your care team.     To sign up for EG Technology visit the website at [email or web address]   You will be asked to  enter the access code listed below, as well as some personal information. Please follow the directions to create your username and password.     Your access code is: [email or web address]  Expires: 2017  2:59 PM     Your access code will  in 90 days. If you need help or a new code, please contact your [facility] or call [phone] for assistance.        Care EveryWhere ID     This is your Care EveryWhere ID. This could be used by other organizations to access your [facility] medical records  [id number]        Your Vitals Were     BMI (Body Mass Index)                   21.56 kg/m2            Blood Pressure from Last 3 Encounters:   17 96/61   17 96/61    Weight from Last 3 Encounters:   17 159 lb   17 159 lb   04/10/17 160 lb              Today, you had the following     No orders found for display         Today's Medication Changes          These changes are accurate as of: 17 11:59 PM.  If you have any questions, ask your nurse or doctor.               Start taking these medicines.        Dose/Directions    pilocarpine 5 MG tablet   Commonly known as:  SALAGEN   Used for:  Disturbance of salivary secretion        Dose:  5 mg   Take 1 tablet (5 mg) by mouth 3 times daily   Quantity:  90 tablet   Refills:  3         Stop taking these medicines if you haven't already. Please contact your care team if you have questions.     amoxicillin-clavulanate 600-42.9 MG/5ML suspension   Commonly known as:  AUGMENTIN-ES           sulfamethoxazole-trimethoprim 800-160 MG per tablet   Commonly known as:  BACTRIM DS/SEPTRA DS                Where to get your medicines      These medications were sent to [facility] [address] [place], [place] - [address] [facility] [address]  [address], [facility] [address]     Phone:  [phone]     pilocarpine 5 MG tablet                Primary Care Provider Office Phone # Fax #    Rush Trevino  [phone]       [facility] [address] [address]  [place] [address]        Thank you!     Thank you for choosing [facility]  for your care. Our goal is always to provide you with excellent care. Hearing back from our patients is one way we can continue to improve our services. Please take a few minutes to complete the written survey that you may receive in the mail after your visit with us. Thank you!             Your Updated Medication List - Protect others around you: Learn how to safely use, store and throw away your medicines at www.disposemymeds.org.          This list is accurate as of: 4/20/17 11:59 PM.  Always use your most recent med list.                   Brand Name Dispense Instructions for use    AQUAPHOR EX          aspirin 81 MG chewable tablet      81 mg       cevimeline 30 MG capsule    EVOXAC    90 capsule    Take 1 capsule (30 mg) by mouth 3 times daily       famotidine 20 MG tablet    PEPCID     20 mg by Per G Tube route       FIBER PO      Administer 1 Dose through feeding tube 4 times daily. Bolus/syringe feed 6 cartons of Isosource 1.5 daily per G tube.?Bolus/syringe 1.5 carton with 60 ml water flush before and 60 ml water flush after each feed - four times a day.?Provide additional 200 ml water flush three times a day for hydration.       FISH OIL PO      Take by mouth daily       LORazepam 1 MG tablet    ATIVAN    4 tablet    Take 0.5-1 tablets (0.5-1 mg) by mouth every 8 hours as needed for anxiety Take 30 minutes prior to departure.  Do not operate a vehicle after taking this medication       MULTI-VITAMINS Tabs      Take 1 tablet by mouth       pilocarpine 5 MG tablet    SALAGEN    90 tablet    Take 1 tablet (5 mg) by mouth 3 times daily       PROBIOTIC DAILY PO          VITAMIN D-1000 MAX ST 1000 UNITS Tabs   Generic drug:  cholecalciferol      Take 1,000 Units by mouth

## 2017-04-20 NOTE — PATIENT INSTRUCTIONS
Please contact [facility] RN with questions or concerns following today's appointment: [phone].    Thank you!

## 2017-04-24 NOTE — PROGRESS NOTES
"Received telephone message from patient on Friday, 4/21/2017 requesting return call to further discuss side effects with newly start prescription, Pilocarpine.  This RN returned patient's call, reports took first dose of medication on Friday, 4/21/2017 and \"broke out in a cold sweat\".  Patient reports he took a dose Saturday morning after feeding through PEG tube and did not have a reaction.  He notes at 1230 he took another dose, did not eat and again broke out in a cold sweat.  Informed patient that would review with Dr. Magdaleno and return call.    Reviewed with Dr. Magdaleno, sweating is a noted side effect of Pilocarpine.  Reports less side effects noted with use of Evoxac, however, medication not covered by patient's insurance.  If side effects outweigh benefit of medication, patient should discontinue medication and use supportive measures such as baking soda and salt rinse, ACT lozenges and Biotene products.    This RN contacted patient and reviewed above recommendation.  Patient reports he did not take medication on Sunday and will try again today after providing nutrition through his PEG tube.  He notes that if side effects continue, he will stop taking medications and use over the counter products as discussed.  Patient had no further questions or concerns and was instructed to call this RN if further questions or concerns arise.    Padma Hogue RN BSN OCN  "

## 2017-04-26 NOTE — PROGRESS NOTES
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 Whyte)    Subjective: Patient notes some continued drainage from the neck.  Not especially bothersome. Eating and drinking reasonably well and supplementing via peg. Cultures were negative earlier this week. Saw Dr. Kumar yesterday and had some more fluid removed from different location apparently with cultures sent.  Patient wishes to d/c radiation as he feels he can not tolerate any more.    Nursing ROS:   Nutrition Alteration  Diet Type: Patient's Preference  Nutrition Note: patient has PEG, 3-5 cans of Isosource, eating soft foods  Skin  Skin Intervention: patient using Aquaphor      Objective:   Wt 159 lb  BMI 21.56 kg/m2  Gen: Appears well, NAD  Skin: Mild diffuse erythema over treatment field    Labs:  CBC RESULTS: No results for input(s): WBC, RBC, HGB, HCT, MCV, MCH, MCHC, RDW, PLT in the last 72698 hours.  ELECTROLYTES:  No results for input(s): NA, POTASSIUM, CHLORIDE, JESSI, CO2, BUN, CR, GLC in the last 92502 hours.    Assessment:    Tolerating radiation therapy well.  All questions and concerns addressed.  I had a prolonged discussion with Mr. Ospina and strongly recommend against stopping treatment early.  We discussed that efficacy of radiation will significantly be diminished with incomplete treatment and the fact he had a weak break due to drainage in neck.  Other than the neck drainage, he has actually tolerated treatment quite well and likely would have no significant issues  with completing the remainder of the course. I had even proposed adding on a little treatment, or treating bid some days due to his break. We discussed that his cancer will be at higher risk for recurrence which may be fatal.  After discussion, he ultimately wishes to stop treatment knowing the risks    Plan:   1. Patient discontinued treatment  2. Discussed ctd skin, oral cares.  3. F/u in 2 weeks. Has f/u with Dr. Christine and Dr. Kumar      Mosaiq chart and setup information reviewed  Port images reviewed         Educational Topic Discussed  Education Instructions: patient was seen for follow-up with Dr. Kumar on 4/19/2017, fluid aspirated from right jaw and sent for culture, patient wishing to stop radiation treatment and here to further discuss with Dr. Magdaleno, patient will contact clinic with Dr. Christine to schedule follow-up visit.      Quique Magdaleno M.D.  [facility]  Radiation Oncology    [phone] clinic  [phone] pager

## 2017-05-05 NOTE — PATIENT INSTRUCTIONS
Please contact [facility] RN with questions or concerns following today's appointment: [phone].    Thank you!

## 2017-05-05 NOTE — PROGRESS NOTES
FOLLOW-UP VISIT    Patient Name: Quirino Ospina      : 1940     Age: 76 year old        ______________________________________________________________________________     Chief Complaint   Patient presents with     Cancer     Radiation oncology return visit with Dr. Magdaleno     /68 (BP Location: Left arm, Patient Position: Chair, Cuff Size: Adult Regular)  Pulse 79  Temp 97  F (36.1  C) (Oral)  Resp 18  Ht 6'  Wt 155 lb  SpO2 98%  BMI 21.02 kg/m2     Date Radiation Completed: Patient received 4,800 cGy out of planned 6,000 cGy with last treatment date of 2017.    Pain  Current history of pain associated with this visit:   Intensity: 2/10  Current: sore  Location: right mouth  Treatment: patient denies need for medication management    Labs  Other Labs: No    Imaging  None      Dental:   Most Recent Dental Visit: prior to start of radiation treatment      Speech/Swallowing:   Most Recent evaluation or testing: Speech therapy treatment scheduled today, 2017 at [facility]  Swallowing Restrictions: No difficulties with swallowing    Trismus/Jaw Exercises: daily    Nutrition:  PEG Intake 4-5# cans per day, type of solution (unknown solution to patient)  Weight:   Wt Readings from Last 3 Encounters:   17 155 lb   17 159 lb   17 159 lb       Other Appointments:     MD Name: Dr. Kumar Appointment Date: 2017   MD Name: Dr Christine Appointment Date: patient will call to scheduled appointment   MD Name: Appointment Date:       Residual Radiation side effect: Patient reports he is feeling discouraged, notes on-going fatigue.  Report on-going scant yellow drainage from right neck/face incision, recently completed course of antibiotics, history of Bactrim and Flagyl.  Patient reports PEG tube change 10 days ago and since has been experience severe abdominal cramping when eating food by mouth, patient reports cramping happens with both food intake and pills in capsul form.   Patient reports limited ROM of oral cavity, scheduled to see speech today.  Stopped using therabite as was concerned for breaking his bottom teeth.

## 2017-05-05 NOTE — MR AVS SNAPSHOT
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    Thank you!          Follow-ups after your visit        Who to contact     If you have questions or need follow up information about today's clinic visit or your schedule please contact [facility] directly at [phone].  Normal or non-critical lab and imaging results will be communicated to you by MyChart, letter or phone within 4 business days after the clinic has received the results. If you do not hear from us within 7 days, please contact the clinic through Pixel Velocityhart or phone. If you have a critical or abnormal lab result, we will notify you by phone as soon as possible.  Submit refill requests through Pepperdata or call your pharmacy and they will forward the refill request to us. Please allow 3 business days for your refill to be completed.          Additional Information About Your Visit        MyChart Information     Pepperdata is an electronic gateway that provides easy, online access to your medical records. With Pepperdata, you can request a clinic appointment, read your test results, renew a prescription or communicate with your care team.     To sign up for Pepperdata visit the website at [email or web address]   You will be asked to enter the access code listed below, as well as some personal information. Please follow the directions to create your username and password.     Your access code is: [email or web address]  Expires: 5/23/2017  2:59 PM     Your access code will   in 90 days. If you need help or a new code, please contact your [facility] or call [phone] for assistance.        Care EveryWhere ID     This is your Care EveryWhere ID. This could be used by other organizations to access your [facility] medical records  [id number]        Your Vitals Were     Pulse Temperature Respirations Height Pulse Oximetry BMI (Body Mass Index)    79 97  F (36.1  C) (Oral) 18 6' 98% 21.02 kg/m2       Blood Pressure from Last 3 Encounters:   17 106/68   17 96/61   17 96/61    Weight from Last 3 Encounters:   17 155 lb   17 159 lb   17 159 lb              Today, you had the following     No orders found for display         Today's Medication Changes          These changes are accurate as of: 17 11:59 PM.  If you have any questions, ask your nurse or doctor.               Stop taking these medicines if you haven't already. Please contact your care team if you have questions.     cevimeline 30 MG capsule   Commonly known as:  EVOXAC           famotidine 20 MG tablet   Commonly known as:  PEPCID                    Primary Care Provider Office Phone # Fax #    Rush Trevino [phone]       [facility] [address]  [place] [address]        Thank you!     Thank you for choosing [facility]  for your care. Our goal is always to provide you with excellent care. Hearing back from our patients is one way we can continue to improve our services. Please take a few minutes to complete the written survey that you may receive in the mail after your visit with us. Thank you!             Your Updated Medication List - Protect others around you: Learn how to safely use, store and throw away your medicines at www.disposemymeds.org.          This list is accurate as of: 17 11:59 PM.  Always use your most recent med list.                   Brand Name Dispense Instructions for  use    FIBER PO      Reported on 5/5/2017       FISH OIL PO      Take by mouth daily       MULTI-VITAMINS Tabs      Take 1 tablet by mouth       pilocarpine 5 MG tablet    SALAGEN    90 tablet    Take 1 tablet (5 mg) by mouth 3 times daily       PROBIOTIC DAILY PO          VITAMIN D-1000 MAX ST 1000 UNITS Tabs   Generic drug:  cholecalciferol      Take 1,000 Units by mouth

## 2017-05-08 NOTE — PROGRESS NOTES
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  The patient underwent a right modified radical neck dissection and subsequently had a right segmental mandibulectomy reconstruction with radial forearm free flap.  Pathologic stage AZEB (T4a N2b MX).  The patient underwent postoperative radiotherapy.      RADIATION TREATMENT:  4800 cGy in 24 fractions out of a planned 6600 cGy.  The patient discontinued his treatment early.  His last treatment was 04/17/2017.      INTERVAL SINCE COMPLETION OF RADIATION:  Approximately 2 weeks.      SUBJECTIVE:  Mr. Quirino Ospina is a 76-year-old with a squamous cell carcinoma of the oral cavity.  He underwent resection of this along with a neck dissection followed by postoperative radiotherapy.  The patient did decline concurrent chemotherapy with this radiotherapy.      Mr. Ospina's radiation was somewhat complicated.  After about 3 weeks of radiation he started to develop some opening and drainage in his neck incision.  This was erythematous and tender.  He did get seen by Dr. Christine and that point underwent drainage of this and was started on some antibiotics.  However, he did have a break in his treatment.  He subsequently did resume his treatment and was tolerating it fairly well.  He continues to have some intermittent drainage from his right neck and had this aspirated on another occasion.  From radiation standpoint, he seemed to be doing well.  He was treated for mild mucositis with pain and swelling and was supplementing with a feeding tube.  He otherwise seemed to be getting along well.  Given his issues with radiation and his seroma/abscess, he ultimately felt that he could not continue with his treatment.  Despite our strong recommendations, he decided to discontinue his treatment knowing the increased risk of recurrence.     Since completion of treatment, he has continued to follow up with Dr. Christine and Dr. Vidal.  Apparently did  have some cultures that returned positive and has recently been on a course of antibiotics.  He continues to note some drainage in his neck.  He was on Bactrim and Flagyl.  He continues to have some fatigue.  He did recently have exchange if his feeding tube and now notes some abdominal pain and cramping when eating solid food.  He does not notice this so much with using his feeding tube for intake or with liquids.  He does continue to have issues with trismus, which have been present since his diagnosis and surgery and has been using TheraBite in the past; however, has not been using as much lately.      As far as abdominal pain and cramping with oral intake, he is going to follow up at [facility] today for further evaluation after our visit.  Otherwise, his pain from his mucositis is for the most part improved.  He has no other complaints.      OBJECTIVE:   VITAL SIGNS:  Blood pressure 106/68, pulse 79, respirations 18, weight 155 pounds.  O2 sats 98% on room air.   GENERAL:  He is alert and oriented in no distress.   NECK:  Reveals continued postoperative changes from his neck dissection.   SKIN:  There is quite a bit of edema and fibrosis.  In the posterior aspect of his incision does continue to have some drainage.  This is the site of another drainage were apparently had some fluid aspirated, there is a little bit of dimpling of the skin and a little bit of excess skin there.  There is no clear cervical or supraclavicular adenopathy.   HEENT:  His oral cavity and oropharynx looked actually healed up well.  This exam was somewhat limited due to his trismus.      ASSESSMENT AND PLAN:  Mr. Ospina is a 76-year-old with squamous cell carcinoma of the oral cavity.  He underwent a segmental mandibulectomy and reconstruction with radial forearm free flap along with a modified radical neck dissection.  He had a pathologic stage AZEB (pT4a N2b MX).  He was recommended to undergo adjuvant chemoradiation; however, declined his  concurrent chemo.  He did have a difficult course of radiotherapy with some issues with seroma/abscess in his right neck and did have a break in his treatment.  He ultimately received 4800 cGy out of a planned at least 6000 cGy.  He did know the potential implications of discontinuing his treatment early with his higher risk for recurrence.  He is about 2 weeks out now and continues to have some issues from his treatment.      As far as his neck drainage, this seems to have improved somewhat.  He has recently completed a course of antibiotics and continues to follow up with Dr. Tran and Dr. Christine.  His mucositis has improved significantly since his treatment.  He does continue to have quite bit of trismus.  Thus, I encouraged him to continue to work on physical therapy and is using his TheraBite for this.  We did review the potential late effects of radiotherapy.  I did discuss that he actually got a fairly moderate dose of radiotherapy.  Thus, hopefully some other significant side effects of radiotherapy in the long-term will be less given the fact he did discontinue his treatment early.  However, again we did review that he is going to be at higher risk for recurrence and needs to be very diligent with his surveillance and discussed potential timing of restaging with imaging.  He will discuss this with Dr. Christine when he sees her again.      At this point, he again does have followup with Dr. Christine and Dr. Tran and is actually going to [facility] today for followup apparently regarding his tube feed issues.  I did offer to see Mr. Ospina at  [facility] where I will be moving to in the next week; he will contact us if he wants to follow up with me there.  He was encouraged to contact me with any questions or concerns in the future.      Thank you for allowing us to participate in this patient's care.  Please feel free to call with questions or concerns.     MELISSA GLOVER MD             D:  2017 14:04   T: 2017 06:25   MT: [id number]      Name:     HELENA AMEZQUITA   MRN:      [id number]        Account:      [id number]   :      1940           Visit Date:   2017      Document: [id number]       cc: Huang Christine MD

## 2017-05-16 NOTE — MR AVS SNAPSHOT
After Visit Summary   2017    Quirino Ospina    MRN: [id number]           Patient Information     Date Of Birth          1940        Visit Information        Provider Department      2017 12:19 PM Hamilton Lepe [facility]         Follow-ups after your visit        Who to contact     Please call your clinic at [phone] to:    Ask questions about your health    Make or cancel appointments    Discuss your medicines    Learn about your test results    Speak to your doctor   If you have compliments or concerns about an experience at your clinic, or if you wish to file a complaint, please contact [facility] Patient Relations at [phone] or email us at [email or web address]         Additional Information About Your Visit        MyChart Information     Niwa is an electronic gateway that provides easy, online access to your medical records. With Niwa, you can request a clinic appointment, read your test results, renew a prescription or communicate with your care team.     To sign up for On[employer] visit the website at [email or web address]   You will be asked to enter the access code listed below, as well as some personal information. Please follow the directions to create your username and password.     Your access code is: [email or web address]  Expires: 2017 12:19 PM     Your access code will  in 90 days. If you need help or a new code, please contact your [facility] or call [phone] for assistance.        Care EveryWhere ID     This is your Care EveryWhere ID. This could be used by other organizations to access your [place] medical records  [id number]         Blood Pressure from Last 3 Encounters:   No data found for BP    Weight from Last 3 Encounters:   No data found for Wt              Today, you had the following     No orders found for display       Primary Care Provider  Office Phone # Fax Kush Trevino [phone]       [facility] [address]  [place] [address]        Thank you!     Thank you for choosing [facility]  for your care. Our goal is always to provide you with excellent care. Hearing back from our patients is one way we can continue to improve our services. Please take a few minutes to complete the written survey that you may receive in the mail after your visit with us. Thank you!             Your Updated Medication List - Protect others around you: Learn how to safely use, store and throw away your medicines at www.disposemymeds.org.          This list is accurate as of: 5/16/17 11:59 PM.  Always use your most recent med list.                   Brand Name Dispense Instructions for use    FIBER PO      Reported on 5/5/2017       FISH OIL PO      Take by mouth daily       MULTI-VITAMINS Tabs      Take 1 tablet by mouth       pilocarpine 5 MG tablet    SALAGEN    90 tablet    Take 1 tablet (5 mg) by mouth 3 times daily       PROBIOTIC DAILY PO          VITAMIN D-1000 MAX ST 1000 UNITS Tabs   Generic drug:  cholecalciferol      Take 1,000 Units by mouth

## 2017-05-31 NOTE — PROCEDURES
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 The patient underwent a right modified radical neck   dissection and subsequently had a right segmental mandibulectomy reconstruction with radial forearm free flap.   Pathologic stage AZEB (T4a N2b MX).  Patient declined concurrent chemotherapy.                          Details of the treatments summarized below are found in records kept in the Department of Radiation Oncology at [facility].  Treatment Summary:  Radiation Oncology - Course: 1    Treatment Site       Dose                 Modality From To Elapsed Days Fx.  1 OC_Bilat_Neck    4,800 cGy    06 X  3/07/2017  2017  41 24          Dose per Fraction:        Total Dose:        200 cGy     4,800 cGy        COMMENTS:                      He did have a difficult course of radiotherapy with some issues with seroma/abscess in his right neck and did have a break   in his treatment from 3/31 through 2017. He ultimately received 4800 cGy out of a planned at least 6000 cGy. He did   know the potential implications of discontinuing his treatment early with his higher risk for recurrence.  He experienced   other side effects of fatigue, mucositis and skin reaction.     PAIN MANAGEMENT:      Patient used ibuprofen for pain.                          FOLLOW UP PLAN:    Patient will follow up in 2 weeks with Dr. Magdaleno.  Patient has follow-up scheduled with Dr. Kumar and Dr. Christine.                          Staff Physician: Quique Magdaleno M.D.  Physicist: Tatyana Pena  MS     CC: ,  Zuleyka Christine MD              [facility] [address], [place] [address] Phone: [phone]

## 2021-05-29 ENCOUNTER — RECORDS - HEALTHEAST (OUTPATIENT)
Dept: ADMINISTRATIVE | Facility: CLINIC | Age: 81
End: 2021-05-29

## 2021-05-31 VITALS — BODY MASS INDEX: 20.99 KG/M2 | HEIGHT: 72 IN | WEIGHT: 155 LBS

## 2021-06-10 NOTE — ANESTHESIA CARE TRANSFER NOTE
Last vitals:   Vitals:    05/16/17 1955   BP: 139/73   Pulse: 83   Resp: 14   Temp: 37.2  C (99  F)   SpO2: 100%     Patient's level of consciousness is awake  Spontaneous respirations: yes  Maintains airway independently: yes  Dentition unchanged: yes  Oropharynx: oropharynx clear of all foreign objects    QCDR Measures:  ASA# 20 - Surgical Safety Checklist: ASA20A - Safety Checks Done  PQRS# 430 - Adult PONV Prevention: 4558F - Pt received => 2 anti-emetic agents (different classes) preop & intraop  ASA# 8 - Peds PONV Prevention: NA - Not pediatric patient, not GA or 2 or more risk factors NOT present  PQRS# 424 - Almita-op Temp Management: 4559F - At least one body temp DOCUMENTED => 35.5C or 95.9F within required timeframe  PQRS# 426 - PACU Transfer Protocol: - Transfer of care checklist used  ASA# 14 - Acute Post-op Pain: ASA14B - Patient did NOT experience pain >= 7 out of 10    I completed my SBAR handoff to the receiving nurse per policy and procedure.

## 2021-06-10 NOTE — ANESTHESIA POSTPROCEDURE EVALUATION
Patient: Quirino Ospina  RIGHT NECK EXPLORATION  Anesthesia type: general    Patient location: PACU  Last vitals:   Vitals:    05/16/17 2138   BP: 142/77   Pulse: 69   Resp: 12   Temp: 36.3  C (97.3  F)   SpO2:      Post vital signs: stable  Level of consciousness: awake, alert and responds to simple questions  Post-anesthesia pain: pain controlled  Post-anesthesia nausea and vomiting: no  Pulmonary: unassisted, return to baseline  Cardiovascular: stable and blood pressure at baseline  Hydration: adequate  Anesthetic events: no    QCDR Measures:  ASA# 11 - Almita-op Cardiac Arrest: ASA11B - Patient did NOT experience unanticipated cardiac arrest  ASA# 12 - Almita-op Mortality Rate: ASA12B - Patient did NOT die  ASA# 13 - PACU Re-Intubation Rate: ASA13B - Patient did NOT require a new airway mgmt  ASA# 10 - Composite Anes Safety: ASA10A - No serious adverse event  ASA# 38 - New Corneal Injury: ASA38A - No new exposure keratitis or corneal abrasion in PACU    Additional Notes:

## 2021-06-10 NOTE — ANESTHESIA PREPROCEDURE EVALUATION
Anesthesia Evaluation      Patient summary reviewed   No history of anesthetic complications     Airway   Neck ROM: limited  Comment: Unable to separate teeth   Pulmonary - normal exam   (+) a smoker                         Cardiovascular   Exercise tolerance: < 4 METS (More active prior to surgery)  (+) CAD, ,     (-) murmur  ECG reviewed  Rhythm: regular  Rate: normal,    no murmur   ROS comment: Angioplasty 1990     Neuro/Psych - negative ROS     Endo/Other    (-) hypothyroidism, hyperthyroidism, no obesity     Comments: Cancer jaw    GI/Hepatic/Renal    (-) GERD          Dental - normal exam                        Anesthesia Plan  Planned anesthetic: general endotracheal  Nasal awake fiberoptic  Pt would like pad under bottom on OR table.  ASA 3     Anesthetic plan and risks discussed with: patient  Anesthesia plan special considerations: fiberoptic, increased risk of difficult airway,   Post-op plan: routine recovery

## 2021-06-15 PROBLEM — R25.2 TRISMUS: Status: ACTIVE | Noted: 2017-01-01
